# Patient Record
Sex: FEMALE | Race: WHITE | NOT HISPANIC OR LATINO | Employment: FULL TIME | URBAN - METROPOLITAN AREA
[De-identification: names, ages, dates, MRNs, and addresses within clinical notes are randomized per-mention and may not be internally consistent; named-entity substitution may affect disease eponyms.]

---

## 2019-07-18 DIAGNOSIS — M79.606 PAIN OF LOWER EXTREMITY, UNSPECIFIED LATERALITY: Primary | ICD-10-CM

## 2019-07-19 ENCOUNTER — OFFICE VISIT (OUTPATIENT)
Dept: ORTHOPEDICS | Facility: CLINIC | Age: 64
End: 2019-07-19
Payer: MEDICARE

## 2019-07-19 VITALS
BODY MASS INDEX: 28.82 KG/M2 | WEIGHT: 162.69 LBS | DIASTOLIC BLOOD PRESSURE: 80 MMHG | SYSTOLIC BLOOD PRESSURE: 138 MMHG | HEIGHT: 63 IN

## 2019-07-19 DIAGNOSIS — S80.11XA CONTUSION OF RIGHT LEG, INITIAL ENCOUNTER: ICD-10-CM

## 2019-07-19 PROBLEM — S80.02XA CONTUSION OF LEFT KNEE AND LOWER LEG: Status: ACTIVE | Noted: 2019-07-19

## 2019-07-19 PROBLEM — S80.12XA CONTUSION OF LEFT KNEE AND LOWER LEG: Status: ACTIVE | Noted: 2019-07-19

## 2019-07-19 PROCEDURE — 99999 PR PBB SHADOW E&M-EST. PATIENT-LVL III: ICD-10-PCS | Mod: PBBFAC,,, | Performed by: ORTHOPAEDIC SURGERY

## 2019-07-19 PROCEDURE — 1101F PR PT FALLS ASSESS DOC 0-1 FALLS W/OUT INJ PAST YR: ICD-10-PCS | Mod: CPTII,S$GLB,, | Performed by: ORTHOPAEDIC SURGERY

## 2019-07-19 PROCEDURE — 3008F BODY MASS INDEX DOCD: CPT | Mod: CPTII,S$GLB,, | Performed by: ORTHOPAEDIC SURGERY

## 2019-07-19 PROCEDURE — 99203 PR OFFICE/OUTPT VISIT, NEW, LEVL III, 30-44 MIN: ICD-10-PCS | Mod: S$GLB,,, | Performed by: ORTHOPAEDIC SURGERY

## 2019-07-19 PROCEDURE — 3008F PR BODY MASS INDEX (BMI) DOCUMENTED: ICD-10-PCS | Mod: CPTII,S$GLB,, | Performed by: ORTHOPAEDIC SURGERY

## 2019-07-19 PROCEDURE — 3075F PR MOST RECENT SYSTOLIC BLOOD PRESS GE 130-139MM HG: ICD-10-PCS | Mod: CPTII,S$GLB,, | Performed by: ORTHOPAEDIC SURGERY

## 2019-07-19 PROCEDURE — 99203 OFFICE O/P NEW LOW 30 MIN: CPT | Mod: S$GLB,,, | Performed by: ORTHOPAEDIC SURGERY

## 2019-07-19 PROCEDURE — 3079F PR MOST RECENT DIASTOLIC BLOOD PRESSURE 80-89 MM HG: ICD-10-PCS | Mod: CPTII,S$GLB,, | Performed by: ORTHOPAEDIC SURGERY

## 2019-07-19 PROCEDURE — 3075F SYST BP GE 130 - 139MM HG: CPT | Mod: CPTII,S$GLB,, | Performed by: ORTHOPAEDIC SURGERY

## 2019-07-19 PROCEDURE — 3079F DIAST BP 80-89 MM HG: CPT | Mod: CPTII,S$GLB,, | Performed by: ORTHOPAEDIC SURGERY

## 2019-07-19 PROCEDURE — 99999 PR PBB SHADOW E&M-EST. PATIENT-LVL III: CPT | Mod: PBBFAC,,, | Performed by: ORTHOPAEDIC SURGERY

## 2019-07-19 PROCEDURE — 1101F PT FALLS ASSESS-DOCD LE1/YR: CPT | Mod: CPTII,S$GLB,, | Performed by: ORTHOPAEDIC SURGERY

## 2019-07-19 RX ORDER — CLOPIDOGREL BISULFATE 75 MG/1
TABLET ORAL
Refills: 1 | COMMUNITY
Start: 2019-05-21

## 2019-07-19 RX ORDER — ROSUVASTATIN CALCIUM 20 MG/1
TABLET, COATED ORAL
Refills: 3 | COMMUNITY
Start: 2019-06-13

## 2019-07-19 RX ORDER — LISINOPRIL 5 MG/1
TABLET ORAL
Refills: 1 | COMMUNITY
Start: 2019-06-14

## 2019-07-19 RX ORDER — ISOSORBIDE MONONITRATE 30 MG/1
TABLET, EXTENDED RELEASE ORAL
Refills: 1 | COMMUNITY
Start: 2019-06-15

## 2019-07-19 RX ORDER — RANOLAZINE 500 MG/1
TABLET, EXTENDED RELEASE ORAL
Refills: 1 | COMMUNITY
Start: 2019-05-22

## 2019-07-19 RX ORDER — ZOLPIDEM TARTRATE 10 MG/1
10 TABLET ORAL
COMMUNITY
Start: 2019-04-25

## 2019-07-19 RX ORDER — DOXYCYCLINE 100 MG/1
CAPSULE ORAL
Refills: 0 | COMMUNITY
Start: 2019-07-15

## 2019-07-19 RX ORDER — ACETAMINOPHEN AND PHENYLEPHRINE HCL 325; 5 MG/1; MG/1
TABLET ORAL
COMMUNITY

## 2019-07-19 RX ORDER — CHOLECALCIFEROL (VITAMIN D3) 50 MCG
TABLET ORAL DAILY
COMMUNITY

## 2019-07-19 NOTE — PROGRESS NOTES
Chief Complaint   Patient presents with    Right Lower Leg - Injury, Pain, Swelling       This patient was seen in consultation at the request of Soledad Bunn     HPI: Kirti Caraballo is a 64 y.o. female who presents today complaining of Injury, Pain, and Swelling of the Right Lower Leg       Two weeks ago was stepping onto a chair which slipped out from her and she fell. She hit the long on the chair. She ws able to ambulate and did not immediately seek medical care.  Since then her pain has worsened. It is not bad when she is resting but is worse when she first gets up from sitting and when she has been walking for long periods of time. Not worse with weight bearing. She denies numbness and paresthesias  She had xrays taken with DIS and was told she had a fracture of the proximal fibula and was referred to me for further evaluation    This is the extent of the patient's complaints at this time.     Review of Systems   All other systems reviewed and are negative.        Review of patient's allergies indicates:   Allergen Reactions    Morpholine analogues Itching    Penicillins Hives    Sulfa (sulfonamide antibiotics) Rash         Current Outpatient Medications:     aspirin 81 MG Chew, Take 81 mg by mouth once daily., Disp: , Rfl:     biotin 10,000 mcg Cap, Take by mouth., Disp: , Rfl:     cholecalciferol, vitamin D3, (VITAMIN D3) 2,000 unit Tab, Take by mouth once daily., Disp: , Rfl:     citalopram (CELEXA) 20 MG tablet, Take 1 tablet (20 mg total) by mouth once daily., Disp: 30 tablet, Rfl: 3    clopidogrel (PLAVIX) 75 mg tablet, TK 1 T PO QD, Disp: , Rfl: 1    doxycycline (VIBRAMYCIN) 100 MG Cap, TK 1 C PO BID FOR 7 DAYS, Disp: , Rfl: 0    enalapril (VASOTEC) 2.5 MG tablet, Take 1 tablet (2.5 mg total) by mouth once daily., Disp: 30 tablet, Rfl: 3    hydrochlorothiazide (HYDRODIURIL) 12.5 MG Tab, Take 1 tablet (12.5 mg total) by mouth once daily., Disp: 30 tablet, Rfl: 3     hydrocodone-acetaminophen 7.5-500 mg (LORTAB) 7.5-500 mg per tablet, Take by mouth. 1 - 2 Tablet Oral Every 6 hours, Disp: , Rfl:     isosorbide mononitrate (IMDUR) 30 MG 24 hr tablet, TK 1 T PO  BID, Disp: , Rfl: 1    lisinopril (PRINIVIL,ZESTRIL) 5 MG tablet, TK 1 T PO QD, Disp: , Rfl: 1    metformin (GLUCOPHAGE) 500 MG tablet, Take 1 tablet (500 mg total) by mouth 2 (two) times daily with meals., Disp: 60 tablet, Rfl: 3    metoprolol succinate (TOPROL-XL) 25 MG 24 hr tablet, Take 1 tablet (25 mg total) by mouth once daily., Disp: 30 tablet, Rfl: 3    ranitidine (ZANTAC) 150 MG tablet, TK 2 TS PO NIGHTLY, Disp: , Rfl: 2    ranolazine (RANEXA) 500 MG Tb12, TK 1 T PO BID, Disp: , Rfl: 1    rosuvastatin (CRESTOR) 20 MG tablet, TK 1 T PO  QD, Disp: , Rfl: 3    simvastatin (ZOCOR) 80 MG tablet, Take 1 tablet (80 mg total) by mouth every evening., Disp: 30 tablet, Rfl: 3    zolpidem (AMBIEN) 10 mg Tab, Take 10 mg by mouth., Disp: , Rfl:     nitroGLYCERIN (NITROSTAT) 0.4 MG SL tablet, Place 1 tablet (0.4 mg total) under the tongue every 5 (five) minutes as needed for Chest pain., Disp: 25 tablet, Rfl: 6    Past Medical History:   Diagnosis Date    Coronary artery disease     Hyperlipidemia     Hypertension        Patient Active Problem List   Diagnosis    HTN (hypertension)    Hyperlipidemia    Depression    Coronary artery disease    DM (diabetes mellitus)       Past Surgical History:   Procedure Laterality Date    BREAST SURGERY      benign cyst    CARPAL TUNNEL RELEASE       SECTION      HYSTERECTOMY      ROTATOR CUFF REPAIR      TONSILLECTOMY         Social History     Tobacco Use    Smoking status: Never Smoker   Substance Use Topics    Alcohol use: Yes     Comment: Rarely    Drug use: Not on file       Family History   Adopted: Yes   Problem Relation Age of Onset    Cancer Mother         biological mother with breast cancer       Physical Exam:     Vitals:    19 0950   BP:  138/80           General: Weight: 73.8 kg (162 lb 11.2 oz) Body mass index is 28.82 kg/m².  Patient is alert, awake and oriented to time, place and person. Mood and affect are appropriate.  Patient does not appear to be in any distress, denies any constitutional symptoms and appears stated age.   HEENT: Pupils are equal and round, sclera are not injected. External examination of ears and nose reveals no abnormalities. Cranial nerves II-X are grossly intact  Skin: no rashes, abrasions or open wounds on the affected extremity   Resp: No respiratory distress or audible wheezing   CV: 2+  pulses, all extremities warm and well perfused     Right Lower Extremity    Resolving ecchymosis over anterior and medial leg with associated tenderness  No abrasions or lacerations  Nontender over the entire length of the fibula  No ankle tenderness or swelling  Ltsi s/s/sp/dp/t  + ehl/fhl/ta/gs  2+ DP      Imaging:  Two views of the right tib-fib were reviewed -- there are degenerative changes of the proximal end of the fibula and tibia fibular joint however do not see any fracture in this area. No tibia fracture     Assessment: 64 y.o. female with right leg contusion    I explained my diagnostic impression and the reasoning behind it in detail, using layman's terms.  Models and/or pictures were used to help in the explanation.    Plan:   - rice therapy  -activity as tolerated  - Return to clinic in 2 weeks if symptoms worsen or fail to improve --will consider MRI if no improvement    All questions were answered in detail. The patient is in full agreement with the treatment plan and will proceed accordingly.    A note notifying Soledad Bunn of my findings was sent via the electronic medical record     This note was created by combination of typed  and M-Modal dictation. Transcription and phonetic errors may be present.  If there are any questions, please contact me.

## 2019-07-19 NOTE — LETTER
July 19, 2019      Soledad Bunn, CHACHA  [address] [facility]  [address]  [place] [address]           [facility]  [address]  [place] [address]  Phone: [phone]          Patient: Kirti Caraballo   MR Number: [id number]   YOB: 1955   Date of Visit: 7/19/2019       Dear Soledad Bunn:    Thank you for referring Kirti Caraballo to me for evaluation. Attached you will find relevant portions of my assessment and plan of care.    If you have questions, please do not hesitate to call me. I look forward to following Kirti Caraballo along with you.    Sincerely,    Sully Noguera MD    Enclosure  CC:  No Recipients    If you would like to receive this communication electronically, please contact [email or web address] or [phone] to request more information on [employer] access.    For providers and/or their staff who would like to refer a patient to [facility], please contact us through our one-stop-shop provider referral line, [facility], at [phone].    If you feel you have received this communication in error or would no longer like to receive these types of communications, please e-mail [email or web address]

## 2021-02-25 ENCOUNTER — IMMUNIZATION (OUTPATIENT)
Dept: OBSTETRICS AND GYNECOLOGY | Facility: CLINIC | Age: 66
End: 2021-02-25
Payer: MEDICARE

## 2021-02-25 DIAGNOSIS — Z23 NEED FOR VACCINATION: Primary | ICD-10-CM

## 2021-02-25 PROCEDURE — 91300 COVID-19, MRNA, LNP-S, PF, 30 MCG/0.3 ML DOSE VACCINE: CPT | Mod: PBBFAC | Performed by: FAMILY MEDICINE

## 2021-03-18 ENCOUNTER — IMMUNIZATION (OUTPATIENT)
Dept: OBSTETRICS AND GYNECOLOGY | Facility: CLINIC | Age: 66
End: 2021-03-18
Payer: MEDICARE

## 2021-03-18 DIAGNOSIS — Z23 NEED FOR VACCINATION: Primary | ICD-10-CM

## 2021-03-18 PROCEDURE — 0002A COVID-19, MRNA, LNP-S, PF, 30 MCG/0.3 ML DOSE VACCINE: CPT | Mod: CV19,S$GLB,, | Performed by: FAMILY MEDICINE

## 2021-03-18 PROCEDURE — 91300 COVID-19, MRNA, LNP-S, PF, 30 MCG/0.3 ML DOSE VACCINE: CPT | Mod: S$GLB,,, | Performed by: FAMILY MEDICINE

## 2021-03-18 PROCEDURE — 0002A COVID-19, MRNA, LNP-S, PF, 30 MCG/0.3 ML DOSE VACCINE: ICD-10-PCS | Mod: CV19,S$GLB,, | Performed by: FAMILY MEDICINE

## 2021-03-18 PROCEDURE — 91300 COVID-19, MRNA, LNP-S, PF, 30 MCG/0.3 ML DOSE VACCINE: ICD-10-PCS | Mod: S$GLB,,, | Performed by: FAMILY MEDICINE

## 2021-06-21 ENCOUNTER — APPOINTMENT (OUTPATIENT)
Dept: RADIOLOGY | Facility: HOSPITAL | Age: 66
End: 2021-06-21
Attending: ORTHOPAEDIC SURGERY
Payer: MEDICARE

## 2021-06-21 ENCOUNTER — OFFICE VISIT (OUTPATIENT)
Dept: ORTHOPEDICS | Facility: CLINIC | Age: 66
End: 2021-06-21
Payer: MEDICARE

## 2021-06-21 VITALS
RESPIRATION RATE: 18 BRPM | HEIGHT: 63 IN | TEMPERATURE: 99 F | OXYGEN SATURATION: 97 % | BODY MASS INDEX: 28.87 KG/M2 | WEIGHT: 162.94 LBS | HEART RATE: 88 BPM

## 2021-06-21 DIAGNOSIS — M79.671 PAIN OF RIGHT HEEL: ICD-10-CM

## 2021-06-21 DIAGNOSIS — M79.671 PAIN OF RIGHT HEEL: Primary | ICD-10-CM

## 2021-06-21 DIAGNOSIS — M76.61 ACHILLES TENDINITIS OF RIGHT LOWER EXTREMITY: Primary | ICD-10-CM

## 2021-06-21 PROCEDURE — 1125F PR PAIN SEVERITY QUANTIFIED, PAIN PRESENT: ICD-10-PCS | Mod: S$GLB,,, | Performed by: ORTHOPAEDIC SURGERY

## 2021-06-21 PROCEDURE — 3008F BODY MASS INDEX DOCD: CPT | Mod: CPTII,S$GLB,, | Performed by: ORTHOPAEDIC SURGERY

## 2021-06-21 PROCEDURE — 99999 PR PBB SHADOW E&M-EST. PATIENT-LVL V: CPT | Mod: PBBFAC,,, | Performed by: ORTHOPAEDIC SURGERY

## 2021-06-21 PROCEDURE — 1125F AMNT PAIN NOTED PAIN PRSNT: CPT | Mod: S$GLB,,, | Performed by: ORTHOPAEDIC SURGERY

## 2021-06-21 PROCEDURE — 73650 X-RAY EXAM OF HEEL: CPT | Mod: TC,FY,PN,RT

## 2021-06-21 PROCEDURE — 3288F PR FALLS RISK ASSESSMENT DOCUMENTED: ICD-10-PCS | Mod: CPTII,S$GLB,, | Performed by: ORTHOPAEDIC SURGERY

## 2021-06-21 PROCEDURE — 3008F PR BODY MASS INDEX (BMI) DOCUMENTED: ICD-10-PCS | Mod: CPTII,S$GLB,, | Performed by: ORTHOPAEDIC SURGERY

## 2021-06-21 PROCEDURE — 99213 PR OFFICE/OUTPT VISIT, EST, LEVL III, 20-29 MIN: ICD-10-PCS | Mod: S$GLB,,, | Performed by: ORTHOPAEDIC SURGERY

## 2021-06-21 PROCEDURE — 73650 XR CALCANEUS 2 VIEW RIGHT: ICD-10-PCS | Mod: 26,RT,, | Performed by: RADIOLOGY

## 2021-06-21 PROCEDURE — 99213 OFFICE O/P EST LOW 20 MIN: CPT | Mod: S$GLB,,, | Performed by: ORTHOPAEDIC SURGERY

## 2021-06-21 PROCEDURE — 1159F MED LIST DOCD IN RCRD: CPT | Mod: S$GLB,,, | Performed by: ORTHOPAEDIC SURGERY

## 2021-06-21 PROCEDURE — 1159F PR MEDICATION LIST DOCUMENTED IN MEDICAL RECORD: ICD-10-PCS | Mod: S$GLB,,, | Performed by: ORTHOPAEDIC SURGERY

## 2021-06-21 PROCEDURE — 99999 PR PBB SHADOW E&M-EST. PATIENT-LVL V: ICD-10-PCS | Mod: PBBFAC,,, | Performed by: ORTHOPAEDIC SURGERY

## 2021-06-21 PROCEDURE — 3288F FALL RISK ASSESSMENT DOCD: CPT | Mod: CPTII,S$GLB,, | Performed by: ORTHOPAEDIC SURGERY

## 2021-06-21 PROCEDURE — 73650 X-RAY EXAM OF HEEL: CPT | Mod: 26,RT,, | Performed by: RADIOLOGY

## 2021-06-21 PROCEDURE — 1100F PTFALLS ASSESS-DOCD GE2>/YR: CPT | Mod: CPTII,S$GLB,, | Performed by: ORTHOPAEDIC SURGERY

## 2021-06-21 PROCEDURE — 1100F PR PT FALLS ASSESS DOC 2+ FALLS/FALL W/INJURY/YR: ICD-10-PCS | Mod: CPTII,S$GLB,, | Performed by: ORTHOPAEDIC SURGERY

## 2021-07-25 ENCOUNTER — TELEPHONE (OUTPATIENT)
Dept: URGENT CARE | Facility: CLINIC | Age: 66
End: 2021-07-25

## 2021-07-25 ENCOUNTER — CLINICAL SUPPORT (OUTPATIENT)
Dept: URGENT CARE | Facility: CLINIC | Age: 66
End: 2021-07-25
Payer: MEDICARE

## 2021-07-25 DIAGNOSIS — Z20.822 EXPOSURE TO COVID-19 VIRUS: Primary | ICD-10-CM

## 2021-07-25 DIAGNOSIS — U07.1 COVID-19 VIRUS DETECTED: Primary | ICD-10-CM

## 2021-07-25 LAB
CTP QC/QA: YES
SARS-COV-2 RDRP RESP QL NAA+PROBE: POSITIVE

## 2021-07-25 PROCEDURE — U0002: ICD-10-PCS | Mod: QW,S$GLB,, | Performed by: NURSE PRACTITIONER

## 2021-07-25 PROCEDURE — U0002 COVID-19 LAB TEST NON-CDC: HCPCS | Mod: QW,S$GLB,, | Performed by: NURSE PRACTITIONER

## 2021-07-26 ENCOUNTER — INFUSION (OUTPATIENT)
Dept: INFECTIOUS DISEASES | Facility: HOSPITAL | Age: 66
End: 2021-07-26
Attending: NURSE PRACTITIONER
Payer: MEDICARE

## 2021-07-26 VITALS
SYSTOLIC BLOOD PRESSURE: 114 MMHG | TEMPERATURE: 98 F | WEIGHT: 160 LBS | OXYGEN SATURATION: 98 % | RESPIRATION RATE: 16 BRPM | HEIGHT: 62 IN | HEART RATE: 70 BPM | DIASTOLIC BLOOD PRESSURE: 79 MMHG | BODY MASS INDEX: 29.44 KG/M2

## 2021-07-26 DIAGNOSIS — U07.1 COVID-19: Primary | ICD-10-CM

## 2021-07-26 PROCEDURE — M0243 CASIRIVI AND IMDEVI INFUSION: HCPCS | Performed by: INTERNAL MEDICINE

## 2021-07-26 PROCEDURE — 25000003 PHARM REV CODE 250: Performed by: INTERNAL MEDICINE

## 2021-07-26 PROCEDURE — 63600175 PHARM REV CODE 636 W HCPCS: Performed by: INTERNAL MEDICINE

## 2021-07-26 RX ORDER — DIPHENHYDRAMINE HYDROCHLORIDE 50 MG/ML
25 INJECTION INTRAMUSCULAR; INTRAVENOUS ONCE AS NEEDED
Status: ACTIVE | OUTPATIENT
Start: 2021-07-26 | End: 2032-12-22

## 2021-07-26 RX ORDER — ONDANSETRON 4 MG/1
4 TABLET, ORALLY DISINTEGRATING ORAL ONCE AS NEEDED
Status: ACTIVE | OUTPATIENT
Start: 2021-07-26 | End: 2032-12-22

## 2021-07-26 RX ORDER — ALBUTEROL SULFATE 90 UG/1
2 AEROSOL, METERED RESPIRATORY (INHALATION)
Status: ACTIVE | OUTPATIENT
Start: 2021-07-26

## 2021-07-26 RX ORDER — ACETAMINOPHEN 325 MG/1
650 TABLET ORAL ONCE AS NEEDED
Status: ACTIVE | OUTPATIENT
Start: 2021-07-26 | End: 2032-12-22

## 2021-07-26 RX ORDER — EPINEPHRINE 0.3 MG/.3ML
0.3 INJECTION SUBCUTANEOUS
Status: ACTIVE | OUTPATIENT
Start: 2021-07-26

## 2021-07-26 RX ORDER — SODIUM CHLORIDE 0.9 % (FLUSH) 0.9 %
10 SYRINGE (ML) INJECTION
Status: ACTIVE | OUTPATIENT
Start: 2021-07-26

## 2021-07-26 RX ADMIN — CASIRIVIMAB AND IMDEVIMAB 600 MG: 600; 600 INJECTION, SOLUTION, CONCENTRATE INTRAVENOUS at 12:07

## 2022-06-02 ENCOUNTER — OFFICE VISIT (OUTPATIENT)
Dept: ORTHOPEDICS | Facility: CLINIC | Age: 67
End: 2022-06-02
Payer: MEDICARE

## 2022-06-02 VITALS
RESPIRATION RATE: 18 BRPM | SYSTOLIC BLOOD PRESSURE: 148 MMHG | BODY MASS INDEX: 31.8 KG/M2 | DIASTOLIC BLOOD PRESSURE: 82 MMHG | WEIGHT: 172.81 LBS | OXYGEN SATURATION: 97 % | HEIGHT: 62 IN | HEART RATE: 83 BPM

## 2022-06-02 DIAGNOSIS — M65.4 DE QUERVAIN'S TENOSYNOVITIS, RIGHT: Primary | ICD-10-CM

## 2022-06-02 DIAGNOSIS — M25.531 RIGHT WRIST PAIN: Primary | ICD-10-CM

## 2022-06-02 PROCEDURE — 99999 PR PBB SHADOW E&M-EST. PATIENT-LVL V: ICD-10-PCS | Mod: PBBFAC,,, | Performed by: ORTHOPAEDIC SURGERY

## 2022-06-02 PROCEDURE — 99213 OFFICE O/P EST LOW 20 MIN: CPT | Mod: S$GLB,,, | Performed by: ORTHOPAEDIC SURGERY

## 2022-06-02 PROCEDURE — 1159F MED LIST DOCD IN RCRD: CPT | Mod: CPTII,S$GLB,, | Performed by: ORTHOPAEDIC SURGERY

## 2022-06-02 PROCEDURE — 1125F AMNT PAIN NOTED PAIN PRSNT: CPT | Mod: CPTII,S$GLB,, | Performed by: ORTHOPAEDIC SURGERY

## 2022-06-02 PROCEDURE — 3077F SYST BP >= 140 MM HG: CPT | Mod: CPTII,S$GLB,, | Performed by: ORTHOPAEDIC SURGERY

## 2022-06-02 PROCEDURE — 4010F PR ACE/ARB THEARPY RXD/TAKEN: ICD-10-PCS | Mod: CPTII,S$GLB,, | Performed by: ORTHOPAEDIC SURGERY

## 2022-06-02 PROCEDURE — 1160F PR REVIEW ALL MEDS BY PRESCRIBER/CLIN PHARMACIST DOCUMENTED: ICD-10-PCS | Mod: CPTII,S$GLB,, | Performed by: ORTHOPAEDIC SURGERY

## 2022-06-02 PROCEDURE — 3079F PR MOST RECENT DIASTOLIC BLOOD PRESSURE 80-89 MM HG: ICD-10-PCS | Mod: CPTII,S$GLB,, | Performed by: ORTHOPAEDIC SURGERY

## 2022-06-02 PROCEDURE — 99999 PR PBB SHADOW E&M-EST. PATIENT-LVL V: CPT | Mod: PBBFAC,,, | Performed by: ORTHOPAEDIC SURGERY

## 2022-06-02 PROCEDURE — 1125F PR PAIN SEVERITY QUANTIFIED, PAIN PRESENT: ICD-10-PCS | Mod: CPTII,S$GLB,, | Performed by: ORTHOPAEDIC SURGERY

## 2022-06-02 PROCEDURE — 3079F DIAST BP 80-89 MM HG: CPT | Mod: CPTII,S$GLB,, | Performed by: ORTHOPAEDIC SURGERY

## 2022-06-02 PROCEDURE — 3288F PR FALLS RISK ASSESSMENT DOCUMENTED: ICD-10-PCS | Mod: CPTII,S$GLB,, | Performed by: ORTHOPAEDIC SURGERY

## 2022-06-02 PROCEDURE — 3077F PR MOST RECENT SYSTOLIC BLOOD PRESSURE >= 140 MM HG: ICD-10-PCS | Mod: CPTII,S$GLB,, | Performed by: ORTHOPAEDIC SURGERY

## 2022-06-02 PROCEDURE — 1159F PR MEDICATION LIST DOCUMENTED IN MEDICAL RECORD: ICD-10-PCS | Mod: CPTII,S$GLB,, | Performed by: ORTHOPAEDIC SURGERY

## 2022-06-02 PROCEDURE — 3008F PR BODY MASS INDEX (BMI) DOCUMENTED: ICD-10-PCS | Mod: CPTII,S$GLB,, | Performed by: ORTHOPAEDIC SURGERY

## 2022-06-02 PROCEDURE — 99213 PR OFFICE/OUTPT VISIT, EST, LEVL III, 20-29 MIN: ICD-10-PCS | Mod: S$GLB,,, | Performed by: ORTHOPAEDIC SURGERY

## 2022-06-02 PROCEDURE — 3288F FALL RISK ASSESSMENT DOCD: CPT | Mod: CPTII,S$GLB,, | Performed by: ORTHOPAEDIC SURGERY

## 2022-06-02 PROCEDURE — 1101F PT FALLS ASSESS-DOCD LE1/YR: CPT | Mod: CPTII,S$GLB,, | Performed by: ORTHOPAEDIC SURGERY

## 2022-06-02 PROCEDURE — 1101F PR PT FALLS ASSESS DOC 0-1 FALLS W/OUT INJ PAST YR: ICD-10-PCS | Mod: CPTII,S$GLB,, | Performed by: ORTHOPAEDIC SURGERY

## 2022-06-02 PROCEDURE — 4010F ACE/ARB THERAPY RXD/TAKEN: CPT | Mod: CPTII,S$GLB,, | Performed by: ORTHOPAEDIC SURGERY

## 2022-06-02 PROCEDURE — 1160F RVW MEDS BY RX/DR IN RCRD: CPT | Mod: CPTII,S$GLB,, | Performed by: ORTHOPAEDIC SURGERY

## 2022-06-02 PROCEDURE — 3008F BODY MASS INDEX DOCD: CPT | Mod: CPTII,S$GLB,, | Performed by: ORTHOPAEDIC SURGERY

## 2022-06-02 NOTE — PROGRESS NOTES
Assessment: 67 y.o. female with R dequervains tenosynovitis     I explained my diagnostic impression and the reasoning behind it in detail, using layman's terms.     Plan:   - Thumb spica brace  - Voltaren gel   - Can consider injection if no improvement with these treatment modalities   - Return to clinic PRN    All questions were answered in detail. The patient is in full agreement with the treatment plan and will proceed accordingly.    Chief Complaint   Patient presents with    Right Wrist - Pain       Initial visit (6/2/22): Kirti Caraballo is a 67 y.o. female who presents today complaining of Pain of the Right Wrist     Duration of symptoms:  A few weeks   Trauma or new activity: no  Pain is intermittent  Aggravating factors: motion of the wrist   Relieving factors: rest   Radicular symptoms: no numbness, paresthesias   Associated symptoms:  swelling over first dorsal compartment   Prior treatment:  None   Pain does interfere with activities of daily living .  Enjoys crafting     This is the extent of the patient's complaints at this time.       Review of patient's allergies indicates:   Allergen Reactions    Morpholine analogues Itching    Penicillins Hives    Sulfa (sulfonamide antibiotics) Rash         Current Outpatient Medications:     aspirin 81 MG Chew, Take 81 mg by mouth once daily., Disp: , Rfl:     biotin 10,000 mcg Cap, Take by mouth., Disp: , Rfl:     cholecalciferol, vitamin D3, (VITAMIN D3) 50 mcg (2,000 unit) Tab, Take by mouth once daily., Disp: , Rfl:     citalopram (CELEXA) 20 MG tablet, Take 1 tablet (20 mg total) by mouth once daily., Disp: 30 tablet, Rfl: 3    clopidogrel (PLAVIX) 75 mg tablet, TK 1 T PO QD, Disp: , Rfl: 1    doxycycline (VIBRAMYCIN) 100 MG Cap, TK 1 C PO BID FOR 7 DAYS, Disp: , Rfl: 0    enalapril (VASOTEC) 2.5 MG tablet, Take 1 tablet (2.5 mg total) by mouth once daily., Disp: 30 tablet, Rfl: 3    hydrochlorothiazide (HYDRODIURIL) 12.5 MG Tab, Take 1 tablet  (12.5 mg total) by mouth once daily., Disp: 30 tablet, Rfl: 3    hydrocodone-acetaminophen 7.5-500 mg (LORTAB) 7.5-500 mg per tablet, Take by mouth. 1 - 2 Tablet Oral Every 6 hours, Disp: , Rfl:     isosorbide mononitrate (IMDUR) 30 MG 24 hr tablet, TK 1 T PO  BID, Disp: , Rfl: 1    lisinopril (PRINIVIL,ZESTRIL) 5 MG tablet, TK 1 T PO QD, Disp: , Rfl: 1    metformin (GLUCOPHAGE) 500 MG tablet, Take 1 tablet (500 mg total) by mouth 2 (two) times daily with meals., Disp: 60 tablet, Rfl: 3    metoprolol succinate (TOPROL-XL) 25 MG 24 hr tablet, Take 1 tablet (25 mg total) by mouth once daily., Disp: 30 tablet, Rfl: 3    ranitidine (ZANTAC) 150 MG tablet, TK 2 TS PO NIGHTLY, Disp: , Rfl: 2    ranolazine (RANEXA) 500 MG Tb12, TK 1 T PO BID, Disp: , Rfl: 1    rosuvastatin (CRESTOR) 20 MG tablet, TK 1 T PO  QD, Disp: , Rfl: 3    simvastatin (ZOCOR) 80 MG tablet, Take 1 tablet (80 mg total) by mouth every evening., Disp: 30 tablet, Rfl: 3    zolpidem (AMBIEN) 10 mg Tab, Take 10 mg by mouth., Disp: , Rfl:     nitroGLYCERIN (NITROSTAT) 0.4 MG SL tablet, Place 1 tablet (0.4 mg total) under the tongue every 5 (five) minutes as needed for Chest pain., Disp: 25 tablet, Rfl: 6    Current Facility-Administered Medications:     acetaminophen tablet 650 mg, 650 mg, Oral, Once PRN, Messi Bella MD    albuterol inhaler 2 puff, 2 puff, Inhalation, Q20 Min PRN, Messi Bella MD    diphenhydrAMINE injection 25 mg, 25 mg, Intravenous, Once PRN, Messi Bella MD    EPINEPHrine (EPIPEN) 0.3 mg/0.3 mL pen injection 0.3 mg, 0.3 mg, Intramuscular, PRN, Messi Bella MD    methylPREDNISolone sodium succinate injection 40 mg, 40 mg, Intravenous, Once PRN, Messi Bella MD    ondansetron disintegrating tablet 4 mg, 4 mg, Oral, Once PRN, Messi Bella MD    sodium chloride 0.9% 500 mL flush bag, , Intravenous, PRN, Messi Bella MD    sodium chloride 0.9% flush 10 mL, 10 mL, Intravenous, PRN,  "Messi Bella MD    Physical Exam:   Vitals:    06/02/22 0953   BP: (!) 148/82   Pulse: 83   Resp: 18   SpO2: 97%   Weight: 78.4 kg (172 lb 13.5 oz)   Height: 5' 2" (1.575 m)   PainSc:   7   PainLoc: Wrist       General: Weight: 78.4 kg (172 lb 13.5 oz) Body mass index is 31.61 kg/m².  Patient is alert, awake and oriented to time, place and person. Mood and affect are appropriate.  Patient does not appear to be in any distress, denies any constitutional symptoms and appears stated age.   HEENT:  Pupils are equal and round, sclera are not injected. External examination of ears and nose reveals no abnormalities. Cranial nerves II-X are grossly intact  Skin:  no rashes, abrasions or open wounds on the affected extremity   Resp:  No respiratory distress or audible wheezing   CV: 2+  pulses, all extremities warm and well perfused   Right Hand/Wrist Examination:    Observation/Inspection:  Swelling  none    Deformity  none  Discoloration  none     Scars   none    Atrophy  none    HAND/WRIST EXAMINATION:  Finkelstein's Test   Pos  WHAT Test    Pos  CMC grind    Neg  Circumduction test   Neg    Neurovascular Exam:  Digits WWP, brisk CR < 3s throughout  NVI motor/LTS to M/R/U nerves, radial pulse 2+  Tinel's Test - Carpal Tunnel  Neg  Tinel's Test - Cubital Tunnel  Neg  Phalen's Test    Neg  Median Nerve Compression Test Neg    ROM hand/wrist/elbow full, painless       Imaging: 3 views of the right wrist are negative for fracture/degenerative changes    I personally reviewed and interpreted the patient's imaging obtained today in clinic       This note was created by combination of typed  and M-Modal dictation. Transcription and phonetic errors may be present.  If there are any questions, please contact me.    Past Medical History:   Diagnosis Date    Coronary artery disease     Hyperlipidemia     Hypertension        Active Problem List with Overview Notes    Diagnosis Date Noted    Contusion of left knee " and lower leg 2019    DM (diabetes mellitus) 2013    HTN (hypertension) 2013    Hyperlipidemia 2013    Depression 2013    Coronary artery disease        Past Surgical History:   Procedure Laterality Date    BREAST SURGERY      benign cyst    CARPAL TUNNEL RELEASE       SECTION      HYSTERECTOMY      ROTATOR CUFF REPAIR      TONSILLECTOMY         Family History   Adopted: Yes   Problem Relation Age of Onset    Cancer Mother         biological mother with breast cancer

## 2022-06-20 ENCOUNTER — OFFICE VISIT (OUTPATIENT)
Dept: ORTHOPEDICS | Facility: CLINIC | Age: 67
End: 2022-06-20
Payer: MEDICARE

## 2022-06-20 VITALS
SYSTOLIC BLOOD PRESSURE: 110 MMHG | RESPIRATION RATE: 18 BRPM | WEIGHT: 171.94 LBS | OXYGEN SATURATION: 97 % | DIASTOLIC BLOOD PRESSURE: 60 MMHG | HEIGHT: 62 IN | HEART RATE: 81 BPM | BODY MASS INDEX: 31.64 KG/M2

## 2022-06-20 DIAGNOSIS — M65.4 DE QUERVAIN'S TENOSYNOVITIS, RIGHT: Primary | ICD-10-CM

## 2022-06-20 PROCEDURE — 3008F BODY MASS INDEX DOCD: CPT | Mod: CPTII,S$GLB,, | Performed by: ORTHOPAEDIC SURGERY

## 2022-06-20 PROCEDURE — 3074F SYST BP LT 130 MM HG: CPT | Mod: CPTII,S$GLB,, | Performed by: ORTHOPAEDIC SURGERY

## 2022-06-20 PROCEDURE — 3288F PR FALLS RISK ASSESSMENT DOCUMENTED: ICD-10-PCS | Mod: CPTII,S$GLB,, | Performed by: ORTHOPAEDIC SURGERY

## 2022-06-20 PROCEDURE — 99999 PR PBB SHADOW E&M-EST. PATIENT-LVL V: CPT | Mod: PBBFAC,,, | Performed by: ORTHOPAEDIC SURGERY

## 2022-06-20 PROCEDURE — 1100F PTFALLS ASSESS-DOCD GE2>/YR: CPT | Mod: CPTII,S$GLB,, | Performed by: ORTHOPAEDIC SURGERY

## 2022-06-20 PROCEDURE — 20550 TENDON SHEATH: ICD-10-PCS | Mod: RT,S$GLB,, | Performed by: ORTHOPAEDIC SURGERY

## 2022-06-20 PROCEDURE — 4010F PR ACE/ARB THEARPY RXD/TAKEN: ICD-10-PCS | Mod: CPTII,S$GLB,, | Performed by: ORTHOPAEDIC SURGERY

## 2022-06-20 PROCEDURE — 20550 NJX 1 TENDON SHEATH/LIGAMENT: CPT | Mod: RT,S$GLB,, | Performed by: ORTHOPAEDIC SURGERY

## 2022-06-20 PROCEDURE — 1125F PR PAIN SEVERITY QUANTIFIED, PAIN PRESENT: ICD-10-PCS | Mod: CPTII,S$GLB,, | Performed by: ORTHOPAEDIC SURGERY

## 2022-06-20 PROCEDURE — 1125F AMNT PAIN NOTED PAIN PRSNT: CPT | Mod: CPTII,S$GLB,, | Performed by: ORTHOPAEDIC SURGERY

## 2022-06-20 PROCEDURE — 99213 PR OFFICE/OUTPT VISIT, EST, LEVL III, 20-29 MIN: ICD-10-PCS | Mod: 25,S$GLB,, | Performed by: ORTHOPAEDIC SURGERY

## 2022-06-20 PROCEDURE — 3008F PR BODY MASS INDEX (BMI) DOCUMENTED: ICD-10-PCS | Mod: CPTII,S$GLB,, | Performed by: ORTHOPAEDIC SURGERY

## 2022-06-20 PROCEDURE — 3078F PR MOST RECENT DIASTOLIC BLOOD PRESSURE < 80 MM HG: ICD-10-PCS | Mod: CPTII,S$GLB,, | Performed by: ORTHOPAEDIC SURGERY

## 2022-06-20 PROCEDURE — 1159F PR MEDICATION LIST DOCUMENTED IN MEDICAL RECORD: ICD-10-PCS | Mod: CPTII,S$GLB,, | Performed by: ORTHOPAEDIC SURGERY

## 2022-06-20 PROCEDURE — 99213 OFFICE O/P EST LOW 20 MIN: CPT | Mod: 25,S$GLB,, | Performed by: ORTHOPAEDIC SURGERY

## 2022-06-20 PROCEDURE — 1100F PR PT FALLS ASSESS DOC 2+ FALLS/FALL W/INJURY/YR: ICD-10-PCS | Mod: CPTII,S$GLB,, | Performed by: ORTHOPAEDIC SURGERY

## 2022-06-20 PROCEDURE — 1160F RVW MEDS BY RX/DR IN RCRD: CPT | Mod: CPTII,S$GLB,, | Performed by: ORTHOPAEDIC SURGERY

## 2022-06-20 PROCEDURE — 3074F PR MOST RECENT SYSTOLIC BLOOD PRESSURE < 130 MM HG: ICD-10-PCS | Mod: CPTII,S$GLB,, | Performed by: ORTHOPAEDIC SURGERY

## 2022-06-20 PROCEDURE — 4010F ACE/ARB THERAPY RXD/TAKEN: CPT | Mod: CPTII,S$GLB,, | Performed by: ORTHOPAEDIC SURGERY

## 2022-06-20 PROCEDURE — 3288F FALL RISK ASSESSMENT DOCD: CPT | Mod: CPTII,S$GLB,, | Performed by: ORTHOPAEDIC SURGERY

## 2022-06-20 PROCEDURE — 99999 PR PBB SHADOW E&M-EST. PATIENT-LVL V: ICD-10-PCS | Mod: PBBFAC,,, | Performed by: ORTHOPAEDIC SURGERY

## 2022-06-20 PROCEDURE — 1160F PR REVIEW ALL MEDS BY PRESCRIBER/CLIN PHARMACIST DOCUMENTED: ICD-10-PCS | Mod: CPTII,S$GLB,, | Performed by: ORTHOPAEDIC SURGERY

## 2022-06-20 PROCEDURE — 1159F MED LIST DOCD IN RCRD: CPT | Mod: CPTII,S$GLB,, | Performed by: ORTHOPAEDIC SURGERY

## 2022-06-20 PROCEDURE — 3078F DIAST BP <80 MM HG: CPT | Mod: CPTII,S$GLB,, | Performed by: ORTHOPAEDIC SURGERY

## 2022-06-20 RX ORDER — TRIAMCINOLONE ACETONIDE 40 MG/ML
40 INJECTION, SUSPENSION INTRA-ARTICULAR; INTRAMUSCULAR
Status: DISCONTINUED | OUTPATIENT
Start: 2022-06-20 | End: 2022-06-20 | Stop reason: HOSPADM

## 2022-06-20 RX ADMIN — TRIAMCINOLONE ACETONIDE 40 MG: 40 INJECTION, SUSPENSION INTRA-ARTICULAR; INTRAMUSCULAR at 01:06

## 2022-06-20 NOTE — PATIENT INSTRUCTIONS
Your wrist  was injected with two medications today: a numbing medicine (lidocaine) and a corticosteroid (kenalog).  The numbing medicine works immediately and works for a few hours. The steroid medication takes up to a week to work.   You may notice that your pain returns or even worsens after the numbing medicine wears off.    If pain worsens, treat with ice 30 minutes on and 10 minutes off the area, over the counter or prescription anti-inflammatories or tylenol and rest.    Call for any redness, fevers, chills or inability to move the joint. Call the office if pain does not improve in 2-3 days.     If you have diabetes, the steroid can temporarily increase your blood sugar. This can last up to 1-2 weeks.   Be mindful of your diet and monitor your blood sugar at home  If you have any concerns about your blood sugar levels please contact your primary care physician

## 2022-06-20 NOTE — PROGRESS NOTES
Assessment: 67 y.o. female with R dequervains tenosynovitis     I explained my diagnostic impression and the reasoning behind it in detail, using layman's terms.     Plan:   - Thumb spica brace  - Voltaren gel   - Can consider injection if no improvement with these treatment modalities   - Return to clinic PRN    All questions were answered in detail. The patient is in full agreement with the treatment plan and will proceed accordingly.    Chief Complaint   Patient presents with    Right Wrist - Pain, Swelling       Initial visit (6/2/22): Kirti Caraballo is a 67 y.o. female who presents today complaining of Pain and Swelling of the Right Wrist     Duration of symptoms:  A few weeks   Trauma or new activity: no  Pain is intermittent  Aggravating factors: motion of the wrist   Relieving factors: rest   Radicular symptoms: no numbness, paresthesias   Associated symptoms:  swelling over first dorsal compartment   Prior treatment:  None   Pain does interfere with activities of daily living .  Enjoys crafting     6/20/22  Here for follow up of R Dequervains   No improvement with splint, voltaren    This is the extent of the patient's complaints at this time.       Review of patient's allergies indicates:   Allergen Reactions    Morpholine analogues Itching    Penicillins Hives    Sulfa (sulfonamide antibiotics) Rash         Current Outpatient Medications:     aspirin 81 MG Chew, Take 81 mg by mouth once daily., Disp: , Rfl:     biotin 10,000 mcg Cap, Take by mouth., Disp: , Rfl:     cholecalciferol, vitamin D3, (VITAMIN D3) 50 mcg (2,000 unit) Tab, Take by mouth once daily., Disp: , Rfl:     citalopram (CELEXA) 20 MG tablet, Take 1 tablet (20 mg total) by mouth once daily., Disp: 30 tablet, Rfl: 3    clopidogrel (PLAVIX) 75 mg tablet, TK 1 T PO QD, Disp: , Rfl: 1    doxycycline (VIBRAMYCIN) 100 MG Cap, TK 1 C PO BID FOR 7 DAYS, Disp: , Rfl: 0    enalapril (VASOTEC) 2.5 MG tablet, Take 1 tablet (2.5 mg total) by  mouth once daily., Disp: 30 tablet, Rfl: 3    hydrochlorothiazide (HYDRODIURIL) 12.5 MG Tab, Take 1 tablet (12.5 mg total) by mouth once daily., Disp: 30 tablet, Rfl: 3    hydrocodone-acetaminophen 7.5-500 mg (LORTAB) 7.5-500 mg per tablet, Take by mouth. 1 - 2 Tablet Oral Every 6 hours, Disp: , Rfl:     isosorbide mononitrate (IMDUR) 30 MG 24 hr tablet, TK 1 T PO  BID, Disp: , Rfl: 1    lisinopril (PRINIVIL,ZESTRIL) 5 MG tablet, TK 1 T PO QD, Disp: , Rfl: 1    metformin (GLUCOPHAGE) 500 MG tablet, Take 1 tablet (500 mg total) by mouth 2 (two) times daily with meals., Disp: 60 tablet, Rfl: 3    metoprolol succinate (TOPROL-XL) 25 MG 24 hr tablet, Take 1 tablet (25 mg total) by mouth once daily., Disp: 30 tablet, Rfl: 3    ranitidine (ZANTAC) 150 MG tablet, TK 2 TS PO NIGHTLY, Disp: , Rfl: 2    ranolazine (RANEXA) 500 MG Tb12, TK 1 T PO BID, Disp: , Rfl: 1    rosuvastatin (CRESTOR) 20 MG tablet, TK 1 T PO  QD, Disp: , Rfl: 3    simvastatin (ZOCOR) 80 MG tablet, Take 1 tablet (80 mg total) by mouth every evening., Disp: 30 tablet, Rfl: 3    zolpidem (AMBIEN) 10 mg Tab, Take 10 mg by mouth., Disp: , Rfl:     nitroGLYCERIN (NITROSTAT) 0.4 MG SL tablet, Place 1 tablet (0.4 mg total) under the tongue every 5 (five) minutes as needed for Chest pain., Disp: 25 tablet, Rfl: 6    Current Facility-Administered Medications:     acetaminophen tablet 650 mg, 650 mg, Oral, Once PRN, Messi Bella MD    albuterol inhaler 2 puff, 2 puff, Inhalation, Q20 Min PRN, Messi Bella MD    diphenhydrAMINE injection 25 mg, 25 mg, Intravenous, Once PRN, Messi Bella MD    EPINEPHrine (EPIPEN) 0.3 mg/0.3 mL pen injection 0.3 mg, 0.3 mg, Intramuscular, PRN, Messi Bella MD    methylPREDNISolone sodium succinate injection 40 mg, 40 mg, Intravenous, Once PRN, Messi Bella MD    ondansetron disintegrating tablet 4 mg, 4 mg, Oral, Once PRN, Messi Bella MD    sodium chloride 0.9% 500 mL flush  "bag, , Intravenous, PRN, Messi Bella MD    sodium chloride 0.9% flush 10 mL, 10 mL, Intravenous, PRN, Messi Bella MD    Physical Exam:   Vitals:    06/20/22 1252   BP: 110/60   Pulse: 81   Resp: 18   SpO2: 97%   Weight: 78 kg (171 lb 15.3 oz)   Height: 5' 2" (1.575 m)   PainSc:   7   PainLoc: Wrist       General: Weight: 78 kg (171 lb 15.3 oz) Body mass index is 31.45 kg/m².  Patient is alert, awake and oriented to time, place and person. Mood and affect are appropriate.  Patient does not appear to be in any distress, denies any constitutional symptoms and appears stated age.   HEENT:  Pupils are equal and round, sclera are not injected. External examination of ears and nose reveals no abnormalities. Cranial nerves II-X are grossly intact  Skin:  no rashes, abrasions or open wounds on the affected extremity   Resp:  No respiratory distress or audible wheezing   CV: 2+  pulses, all extremities warm and well perfused   Right Hand/Wrist Examination:    Observation/Inspection:  Swelling  none    Deformity  none  Discoloration  none     Scars   none    Atrophy  none    HAND/WRIST EXAMINATION:  Finkelstein's Test   Pos  WHAT Test    Pos  CMC grind    Neg  Circumduction test   Neg    Neurovascular Exam:  Digits WWP, brisk CR < 3s throughout  NVI motor/LTS to M/R/U nerves, radial pulse 2+  Tinel's Test - Carpal Tunnel  Neg  Tinel's Test - Cubital Tunnel  Neg  Phalen's Test    Neg  Median Nerve Compression Test Neg    ROM hand/wrist/elbow full, painless       Imaging: 3 views of the right wrist are negative for fracture/degenerative changes    I personally reviewed and interpreted the patient's imaging obtained today in clinic       This note was created by combination of typed  and M-Modal dictation. Transcription and phonetic errors may be present.  If there are any questions, please contact me.    Past Medical History:   Diagnosis Date    Coronary artery disease     Hyperlipidemia     " Hypertension        Active Problem List with Overview Notes    Diagnosis Date Noted    Contusion of left knee and lower leg 2019    DM (diabetes mellitus) 2013    HTN (hypertension) 2013    Hyperlipidemia 2013    Depression 2013    Coronary artery disease        Past Surgical History:   Procedure Laterality Date    BREAST SURGERY      benign cyst    CARPAL TUNNEL RELEASE       SECTION      HYSTERECTOMY      ROTATOR CUFF REPAIR      TONSILLECTOMY         Family History   Adopted: Yes   Problem Relation Age of Onset    Cancer Mother         biological mother with breast cancer

## 2022-06-20 NOTE — PROCEDURES
Tendon Sheath    Date/Time: 6/20/2022 1:00 PM  Performed by: Sully Noguera MD  Authorized by: Sully Noguera MD     Consent Done?:  Yes (Verbal)  Indications:  Pain  Timeout: prior to procedure the correct patient, procedure, and site was verified    Prep: patient was prepped and draped in usual sterile fashion      Local anesthesia used?: Yes    Local anesthetic:  Topical anesthetic  Location:  Wrist  Site:  R first doral compartment  Ultrasonic guidance for needle placement?: No    Needle size:  25 G  Approach:  Radial  Medications:  40 mg triamcinolone acetonide 40 mg/mL  Patient tolerance:  Patient tolerated the procedure well with no immediate complications

## 2022-09-29 ENCOUNTER — LAB VISIT (OUTPATIENT)
Dept: LAB | Facility: HOSPITAL | Age: 67
End: 2022-09-29
Attending: STUDENT IN AN ORGANIZED HEALTH CARE EDUCATION/TRAINING PROGRAM
Payer: MEDICARE

## 2022-09-29 ENCOUNTER — OFFICE VISIT (OUTPATIENT)
Dept: GASTROENTEROLOGY | Facility: CLINIC | Age: 67
End: 2022-09-29
Payer: MEDICARE

## 2022-09-29 VITALS
HEART RATE: 76 BPM | BODY MASS INDEX: 31.65 KG/M2 | SYSTOLIC BLOOD PRESSURE: 110 MMHG | DIASTOLIC BLOOD PRESSURE: 70 MMHG | WEIGHT: 172 LBS | HEIGHT: 62 IN

## 2022-09-29 DIAGNOSIS — D50.9 IRON DEFICIENCY ANEMIA, UNSPECIFIED IRON DEFICIENCY ANEMIA TYPE: Primary | ICD-10-CM

## 2022-09-29 DIAGNOSIS — K21.9 GASTROESOPHAGEAL REFLUX DISEASE, UNSPECIFIED WHETHER ESOPHAGITIS PRESENT: ICD-10-CM

## 2022-09-29 DIAGNOSIS — D64.9 ANEMIA, UNSPECIFIED TYPE: Primary | ICD-10-CM

## 2022-09-29 DIAGNOSIS — D64.9 ANEMIA, UNSPECIFIED TYPE: ICD-10-CM

## 2022-09-29 LAB
FERRITIN SERPL-MCNC: 19 NG/ML (ref 20–300)
IRON SERPL-MCNC: 63 UG/DL (ref 30–160)
SATURATED IRON: 10 % (ref 20–50)
TOTAL IRON BINDING CAPACITY: 604 UG/DL (ref 250–450)
TRANSFERRIN SERPL-MCNC: 408 MG/DL (ref 200–375)

## 2022-09-29 PROCEDURE — 3008F PR BODY MASS INDEX (BMI) DOCUMENTED: ICD-10-PCS | Mod: CPTII,S$GLB,, | Performed by: STUDENT IN AN ORGANIZED HEALTH CARE EDUCATION/TRAINING PROGRAM

## 2022-09-29 PROCEDURE — 3074F PR MOST RECENT SYSTOLIC BLOOD PRESSURE < 130 MM HG: ICD-10-PCS | Mod: CPTII,S$GLB,, | Performed by: STUDENT IN AN ORGANIZED HEALTH CARE EDUCATION/TRAINING PROGRAM

## 2022-09-29 PROCEDURE — 1101F PR PT FALLS ASSESS DOC 0-1 FALLS W/OUT INJ PAST YR: ICD-10-PCS | Mod: CPTII,S$GLB,, | Performed by: STUDENT IN AN ORGANIZED HEALTH CARE EDUCATION/TRAINING PROGRAM

## 2022-09-29 PROCEDURE — 4010F PR ACE/ARB THEARPY RXD/TAKEN: ICD-10-PCS | Mod: CPTII,S$GLB,, | Performed by: STUDENT IN AN ORGANIZED HEALTH CARE EDUCATION/TRAINING PROGRAM

## 2022-09-29 PROCEDURE — 4010F ACE/ARB THERAPY RXD/TAKEN: CPT | Mod: CPTII,S$GLB,, | Performed by: STUDENT IN AN ORGANIZED HEALTH CARE EDUCATION/TRAINING PROGRAM

## 2022-09-29 PROCEDURE — 99204 OFFICE O/P NEW MOD 45 MIN: CPT | Mod: S$GLB,,, | Performed by: STUDENT IN AN ORGANIZED HEALTH CARE EDUCATION/TRAINING PROGRAM

## 2022-09-29 PROCEDURE — 3078F DIAST BP <80 MM HG: CPT | Mod: CPTII,S$GLB,, | Performed by: STUDENT IN AN ORGANIZED HEALTH CARE EDUCATION/TRAINING PROGRAM

## 2022-09-29 PROCEDURE — 3008F BODY MASS INDEX DOCD: CPT | Mod: CPTII,S$GLB,, | Performed by: STUDENT IN AN ORGANIZED HEALTH CARE EDUCATION/TRAINING PROGRAM

## 2022-09-29 PROCEDURE — 99204 PR OFFICE/OUTPT VISIT, NEW, LEVL IV, 45-59 MIN: ICD-10-PCS | Mod: S$GLB,,, | Performed by: STUDENT IN AN ORGANIZED HEALTH CARE EDUCATION/TRAINING PROGRAM

## 2022-09-29 PROCEDURE — 3078F PR MOST RECENT DIASTOLIC BLOOD PRESSURE < 80 MM HG: ICD-10-PCS | Mod: CPTII,S$GLB,, | Performed by: STUDENT IN AN ORGANIZED HEALTH CARE EDUCATION/TRAINING PROGRAM

## 2022-09-29 PROCEDURE — 1159F MED LIST DOCD IN RCRD: CPT | Mod: CPTII,S$GLB,, | Performed by: STUDENT IN AN ORGANIZED HEALTH CARE EDUCATION/TRAINING PROGRAM

## 2022-09-29 PROCEDURE — 82728 ASSAY OF FERRITIN: CPT | Performed by: STUDENT IN AN ORGANIZED HEALTH CARE EDUCATION/TRAINING PROGRAM

## 2022-09-29 PROCEDURE — 99999 PR PBB SHADOW E&M-EST. PATIENT-LVL V: ICD-10-PCS | Mod: PBBFAC,,, | Performed by: STUDENT IN AN ORGANIZED HEALTH CARE EDUCATION/TRAINING PROGRAM

## 2022-09-29 PROCEDURE — 36415 COLL VENOUS BLD VENIPUNCTURE: CPT | Performed by: STUDENT IN AN ORGANIZED HEALTH CARE EDUCATION/TRAINING PROGRAM

## 2022-09-29 PROCEDURE — 1101F PT FALLS ASSESS-DOCD LE1/YR: CPT | Mod: CPTII,S$GLB,, | Performed by: STUDENT IN AN ORGANIZED HEALTH CARE EDUCATION/TRAINING PROGRAM

## 2022-09-29 PROCEDURE — 1159F PR MEDICATION LIST DOCUMENTED IN MEDICAL RECORD: ICD-10-PCS | Mod: CPTII,S$GLB,, | Performed by: STUDENT IN AN ORGANIZED HEALTH CARE EDUCATION/TRAINING PROGRAM

## 2022-09-29 PROCEDURE — 3074F SYST BP LT 130 MM HG: CPT | Mod: CPTII,S$GLB,, | Performed by: STUDENT IN AN ORGANIZED HEALTH CARE EDUCATION/TRAINING PROGRAM

## 2022-09-29 PROCEDURE — 3288F FALL RISK ASSESSMENT DOCD: CPT | Mod: CPTII,S$GLB,, | Performed by: STUDENT IN AN ORGANIZED HEALTH CARE EDUCATION/TRAINING PROGRAM

## 2022-09-29 PROCEDURE — 84466 ASSAY OF TRANSFERRIN: CPT | Performed by: STUDENT IN AN ORGANIZED HEALTH CARE EDUCATION/TRAINING PROGRAM

## 2022-09-29 PROCEDURE — 3288F PR FALLS RISK ASSESSMENT DOCUMENTED: ICD-10-PCS | Mod: CPTII,S$GLB,, | Performed by: STUDENT IN AN ORGANIZED HEALTH CARE EDUCATION/TRAINING PROGRAM

## 2022-09-29 PROCEDURE — 99999 PR PBB SHADOW E&M-EST. PATIENT-LVL V: CPT | Mod: PBBFAC,,, | Performed by: STUDENT IN AN ORGANIZED HEALTH CARE EDUCATION/TRAINING PROGRAM

## 2022-09-29 RX ORDER — VALSARTAN 80 MG/1
1 TABLET ORAL 2 TIMES DAILY
COMMUNITY
Start: 2022-03-08

## 2022-09-29 RX ORDER — LEVOCETIRIZINE DIHYDROCHLORIDE 5 MG/1
TABLET, FILM COATED ORAL
COMMUNITY
Start: 2022-08-08

## 2022-09-29 RX ORDER — OFLOXACIN 3 MG/ML
1 SOLUTION/ DROPS OPHTHALMIC 4 TIMES DAILY
COMMUNITY
Start: 2022-06-25

## 2022-09-29 RX ORDER — LANOLIN ALCOHOL/MO/W.PET/CERES
500 CREAM (GRAM) TOPICAL
COMMUNITY

## 2022-09-29 RX ORDER — ICOSAPENT ETHYL 500 MG/1
CAPSULE ORAL
COMMUNITY

## 2022-09-29 RX ORDER — FENOFIBRATE 134 MG/1
134 CAPSULE ORAL DAILY
COMMUNITY
Start: 2022-09-23

## 2022-09-29 RX ORDER — FERROUS SULFATE 325(65) MG
325 TABLET, DELAYED RELEASE (ENTERIC COATED) ORAL DAILY
Qty: 30 TABLET | Refills: 3 | Status: SHIPPED | OUTPATIENT
Start: 2022-09-29 | End: 2022-10-29

## 2022-09-29 RX ORDER — FENOFIBRATE 134 MG/1
1 CAPSULE ORAL EVERY MORNING
COMMUNITY
Start: 2022-03-28

## 2022-09-29 RX ORDER — ICOSAPENT ETHYL 1000 MG/1
1 CAPSULE ORAL 4 TIMES DAILY
COMMUNITY
Start: 2022-07-11

## 2022-09-29 RX ORDER — LEVOCETIRIZINE DIHYDROCHLORIDE 5 MG/1
1 TABLET, FILM COATED ORAL NIGHTLY
COMMUNITY
Start: 2022-01-24

## 2022-09-29 RX ORDER — BROMFENAC SODIUM 0.7 MG/ML
1 SOLUTION/ DROPS OPHTHALMIC NIGHTLY
COMMUNITY
Start: 2022-06-25

## 2022-09-29 RX ORDER — OMEPRAZOLE 40 MG/1
40 CAPSULE, DELAYED RELEASE ORAL DAILY
Qty: 30 CAPSULE | Refills: 11 | Status: SHIPPED | OUTPATIENT
Start: 2022-09-29 | End: 2023-09-29

## 2022-09-29 RX ORDER — METFORMIN HYDROCHLORIDE 1000 MG/1
1000 TABLET ORAL 2 TIMES DAILY WITH MEALS
COMMUNITY
Start: 2022-01-24

## 2022-09-29 RX ORDER — BROMFENAC SODIUM 0.7 MG/ML
SOLUTION/ DROPS OPHTHALMIC
COMMUNITY
Start: 2022-06-25

## 2022-09-29 RX ORDER — LOTEPREDNOL ETABONATE 3.8 MG/G
GEL OPHTHALMIC DAILY
COMMUNITY
Start: 2022-07-07

## 2022-09-29 NOTE — PROGRESS NOTES
[facility] Consultation Note    Reason for Consult:  chest pain, reflux     PCP:   Tiffany Adkins   No address on file    Referring MD:  Aaareferral Self  No address on file    HPI:  This is a 67 y.o. female here for evaluation of chest pain and worsening reflux. She has a PMHx of DM, CAD and HTN. She is following here after an ED visit and admission for chest pain 9[place]26 at [facility] [place] She has a pressure in her chest which is mostly constant and it is worse with eating. Has nocturnal heartburn symptoms as well. Takes H2 blocker BID which helps partially. At recent admission, ACS was ruled out with negative cardiac markers. She was seen by cardiology inpatient who ordered a Lexiscan stress test which was unremarkable. Echocardiogram was also done which was also unremarkable. CBC showed Hg 10. BMP showed K 5.2 and Cr 1.19. troponin was normal. No recent abdominal imaging to review. No prior endoscopy on file. Denied NSAID use. No FH of gastric of colon cancer. She is on on zantac currently. On plavix for AC. It was recommended on discharge that she follow with a GI doctor. Denied dysphagia, regurgitation, N/V, hematemesis or melena. BM are normal. No overt GI bleeding.     Was previously followed with Dr Rhodes at . Last EGD was 5 year ago but she doesn't remember the results. No history of ulcer or H pylori. Colonoscopy was around same time and removed a few polyps and said 5 years which should would be due for now.     ROS:  Constitutional: No fevers, chills, No weight loss  ENT: No allergies  CV: No chest pain  Pulm: No cough, No shortness of breath  Ophtho: No vision changes  GI: see HPI  Derm: No rash  Heme: No lymphadenopathy, No bruising  MSK: No arthritis  : No dysuria, No hematuria  Endo: No hot or cold intolerance  Neuro: No syncope, No seizure  Psych: No anxiety, No depression    Medical History:  has a past medical history of Coronary artery disease, Hyperlipidemia, and  Hypertension.    Surgical History:  has a past surgical history that includes  section; Tonsillectomy; Hysterectomy; Rotator cuff repair; Carpal tunnel release; and Breast surgery.    Family History: family history includes Cancer in her mother. She was adopted..        Social History:  reports that she has never smoked. She has never used smokeless tobacco. She reports current alcohol use. She reports that she does not use drugs.    Review of patient's allergies indicates:   Allergen Reactions    Morpholine analogues Itching    Penicillins Hives    Sulfa (sulfonamide antibiotics) Rash       Current Outpatient Rx   Medication Sig Dispense Refill    bromfenac (PROLENSA) 0.07 % Drop INSTILL 1 DROP INTO SURGICAL EYE AT BEDTIME. START 2 DAYS BEFORE SURGERY      fenofibrate micronized (LOFIBRA) 134 MG Cap Take 1 capsule by mouth every morning.      levocetirizine (XYZAL) 5 MG tablet Take 1 tablet by mouth every evening.      linaCLOtide (LINZESS) 145 mcg Cap capsule Take 1 capsule by mouth every morning.      loteprednol etabonate (LOTEMAX SM) 0.38 % DrpG once daily.      metFORMIN (GLUCOPHAGE) 1000 MG tablet Take 1,000 mg by mouth 2 (two) times daily with meals.      valsartan (DIOVAN) 80 MG tablet Take 1 tablet by mouth 2 (two) times daily.      ascorbic Acid (VITAMIN C) 500 mg CpSR Take 500 mg by mouth.      aspirin 81 MG Chew Take 81 mg by mouth once daily.      biotin 10,000 mcg Cap Take by mouth.      cholecalciferol, vitamin D3, (VITAMIN D3) 50 mcg (2,000 unit) Tab Take by mouth once daily.      citalopram (CELEXA) 20 MG tablet Take 1 tablet (20 mg total) by mouth once daily. 30 tablet 3    clopidogrel (PLAVIX) 75 mg tablet TK 1 T PO QD  1    doxycycline (VIBRAMYCIN) 100 MG Cap TK 1 C PO BID FOR 7 DAYS  0    enalapril (VASOTEC) 2.5 MG tablet Take 1 tablet (2.5 mg total) by mouth once daily. 30 tablet 3    fenofibrate micronized (LOFIBRA) 134 MG Cap Take 134 mg by mouth once daily.      hydrochlorothiazide  "(HYDRODIURIL) 12.5 MG Tab Take 1 tablet (12.5 mg total) by mouth once daily. 30 tablet 3    hydrocodone-acetaminophen 7.5-500 mg (LORTAB) 7.5-500 mg per tablet Take by mouth. 1 - 2 Tablet Oral Every 6 hours      icosapent ethyL (VASCEPA) 0.5 gram Cap Take by mouth.      icosapent ethyL (VASCEPA) 1 gram Cap Take 1 capsule by mouth 4 (four) times daily.      isosorbide mononitrate (IMDUR) 30 MG 24 hr tablet TK 1 T PO  BID  1    levocetirizine (XYZAL) 5 MG tablet SMARTSI Tablet(s) By Mouth Every Evening      lisinopril (PRINIVIL,ZESTRIL) 5 MG tablet TK 1 T PO QD  1    metformin (GLUCOPHAGE) 500 MG tablet Take 1 tablet (500 mg total) by mouth 2 (two) times daily with meals. 60 tablet 3    metoprolol succinate (TOPROL-XL) 25 MG 24 hr tablet Take 1 tablet (25 mg total) by mouth once daily. 30 tablet 3    nitroGLYCERIN (NITROSTAT) 0.4 MG SL tablet Place 1 tablet (0.4 mg total) under the tongue every 5 (five) minutes as needed for Chest pain. 25 tablet 6    ofloxacin (OCUFLOX) 0.3 % ophthalmic solution Place 1 drop into both eyes 4 (four) times daily.      omeprazole (PRILOSEC) 40 MG capsule Take 1 capsule (40 mg total) by mouth once daily. 30 capsule 11    PROLENSA 0.07 % Drop 1 drop every evening.      ranitidine (ZANTAC) 150 MG tablet TK 2 TS PO NIGHTLY  2    ranolazine (RANEXA) 500 MG Tb12 TK 1 T PO BID  1    rosuvastatin (CRESTOR) 20 MG tablet TK 1 T PO  QD  3    simvastatin (ZOCOR) 80 MG tablet Take 1 tablet (80 mg total) by mouth every evening. 30 tablet 3    zolpidem (AMBIEN) 10 mg Tab Take 10 mg by mouth.         Objective Findings:    Vital Signs:  /70   Pulse 76   Ht 5' 2" (1.575 m)   Wt 78 kg (172 lb)   BMI 31.46 kg/m²   Body mass index is 31.46 kg/m².    Physical Exam:  General Appearance: Well appearing in no acute distress  Head:   Normocephalic, without obvious abnormality  Eyes:    No scleral icterus, EOMI  ENT: Neck supple, Lips, mucosa, and tongue normal    Lungs: CTA bilaterally in anterior " and posterior fields, no wheezes, no crackles.  Heart:  Regular rate and rhythm, S1, S2 normal, no murmurs heard  Abdomen: Soft, non tender, non distended with positive bowel sounds in all four quadrants. No hepatosplenomegaly, ascites, or mass  Extremities: 2+ pulses, no clubbing, cyanosis or edema  Skin: No rash  Neurologic: no focal deficits       Labs:  Lab Results   Component Value Date    WBC 7.42 06/10/2013    HGB 13.5 06/10/2013    HCT 41.4 06/10/2013     06/10/2013    CHOL 184 06/10/2013    TRIG 188 (H) 06/10/2013    HDL 32 (L) 06/10/2013    ALT 36 06/10/2013    AST 34 06/10/2013     06/10/2013    K 4.9 06/10/2013     06/10/2013    CREATININE 0.8 06/10/2013    BUN 18 06/10/2013    CO2 22 (L) 06/10/2013    TSH 0.474 03/16/2012    HGBA1C 5.5 06/04/2021       Imaging: reviewed     Endoscopy:  none     Assessment:    Chest Pain   CAD   Normocytic Anemia   Hx colon polyps     Patient with recent worsening chest pain and reflux symptoms. Currently on H2 blocker. Has a CV history but had an extensive negative cardiac work up this month at [facility] [place]. There is concern for GI origin of symptoms. Will start PPI and discussed GERD modifications.      Recommendations:    - Iron panel with ferritin   - Start PPI, take on empty stomach 30 mins before meals   - H2 blocker for breakthrough   - GERD precautions discussed   - Plan for EGD due to concern for GI origin of symptoms and anemia. Patient has CAD history but recently normal troponin, Lexiscan stress and echo at [facility] [place] this month   - Cscope at same time as she is due for CRC surveillance     RTC after endoscopy       Order summary:  Orders Placed This Encounter    IRON AND TIBC    FERRITIN    Ambulatory referral/consult to Endo Procedure     omeprazole (PRILOSEC) 40 MG capsule         Thank you so much for allowing me to participate in the care of Kirti Flores MD  Gastroenterology   [facility]

## 2022-09-29 NOTE — PROCEDURES
"Kirti Caraballo is a 67 y.o. female patient.    Pulse: 76 (09/29/22 0816)  BP: 110/70 (09/29/22 0816)  Weight: 78 kg (172 lb) (09/29/22 0816)  Height: 5' 2" (157.5 cm) (09/29/22 0816)       Procedures    9/29/2022    "

## 2022-10-04 ENCOUNTER — TELEPHONE (OUTPATIENT)
Dept: ENDOSCOPY | Facility: HOSPITAL | Age: 67
End: 2022-10-04
Payer: MEDICARE

## 2022-10-04 NOTE — TELEPHONE ENCOUNTER
Contacted pt to schedule EGD. Pt is on Plavix. Plavix hold faxed to Dr. Chandan Akers -at [facility]. Scheduling pending reply.  Fax: [phone]  Phone: [phone]

## 2022-10-10 ENCOUNTER — TELEPHONE (OUTPATIENT)
Dept: ENDOSCOPY | Facility: HOSPITAL | Age: 67
End: 2022-10-10
Payer: MEDICARE

## 2022-10-20 ENCOUNTER — TELEPHONE (OUTPATIENT)
Dept: ENDOSCOPY | Facility: HOSPITAL | Age: 67
End: 2022-10-20
Payer: MEDICARE

## 2022-10-28 ENCOUNTER — CLINICAL SUPPORT (OUTPATIENT)
Dept: ENDOSCOPY | Facility: HOSPITAL | Age: 67
End: 2022-10-28
Attending: STUDENT IN AN ORGANIZED HEALTH CARE EDUCATION/TRAINING PROGRAM
Payer: MEDICARE

## 2022-10-28 VITALS — BODY MASS INDEX: 32.02 KG/M2 | WEIGHT: 174 LBS | HEIGHT: 62 IN

## 2022-10-28 DIAGNOSIS — K21.9 GASTROESOPHAGEAL REFLUX DISEASE, UNSPECIFIED WHETHER ESOPHAGITIS PRESENT: ICD-10-CM

## 2022-10-28 DIAGNOSIS — D64.9 ANEMIA, UNSPECIFIED TYPE: ICD-10-CM

## 2022-11-09 ENCOUNTER — HOSPITAL ENCOUNTER (OUTPATIENT)
Facility: HOSPITAL | Age: 67
Discharge: HOME | End: 2022-11-09
Attending: STUDENT IN AN ORGANIZED HEALTH CARE EDUCATION/TRAINING PROGRAM | Admitting: STUDENT IN AN ORGANIZED HEALTH CARE EDUCATION/TRAINING PROGRAM
Payer: MEDICARE

## 2022-11-09 ENCOUNTER — ANESTHESIA EVENT (OUTPATIENT)
Dept: ENDOSCOPY | Facility: HOSPITAL | Age: 67
End: 2022-11-09
Payer: MEDICARE

## 2022-11-09 ENCOUNTER — ANESTHESIA (OUTPATIENT)
Dept: ENDOSCOPY | Facility: HOSPITAL | Age: 67
End: 2022-11-09
Payer: MEDICARE

## 2022-11-09 VITALS
HEART RATE: 87 BPM | SYSTOLIC BLOOD PRESSURE: 161 MMHG | RESPIRATION RATE: 16 BRPM | OXYGEN SATURATION: 96 % | TEMPERATURE: 99 F | DIASTOLIC BLOOD PRESSURE: 84 MMHG

## 2022-11-09 DIAGNOSIS — D64.9 ANEMIA, UNSPECIFIED TYPE: ICD-10-CM

## 2022-11-09 DIAGNOSIS — Z12.11 COLON CANCER SCREENING: Primary | ICD-10-CM

## 2022-11-09 LAB — POCT GLUCOSE: 178 MG/DL (ref 70–110)

## 2022-11-09 PROCEDURE — 43239 EGD BIOPSY SINGLE/MULTIPLE: CPT | Mod: ,,, | Performed by: STUDENT IN AN ORGANIZED HEALTH CARE EDUCATION/TRAINING PROGRAM

## 2022-11-09 PROCEDURE — 88305 TISSUE EXAM BY PATHOLOGIST: CPT | Performed by: PATHOLOGY

## 2022-11-09 PROCEDURE — D9220A PRA ANESTHESIA: ICD-10-PCS | Mod: CRNA,,, | Performed by: NURSE ANESTHETIST, CERTIFIED REGISTERED

## 2022-11-09 PROCEDURE — 43239 PR EGD, FLEX, W/BIOPSY, SGL/MULTI: ICD-10-PCS | Mod: ,,, | Performed by: STUDENT IN AN ORGANIZED HEALTH CARE EDUCATION/TRAINING PROGRAM

## 2022-11-09 PROCEDURE — 88305 TISSUE EXAM BY PATHOLOGIST: CPT | Mod: 26,,, | Performed by: PATHOLOGY

## 2022-11-09 PROCEDURE — 27201012 HC FORCEPS, HOT/COLD, DISP: Performed by: STUDENT IN AN ORGANIZED HEALTH CARE EDUCATION/TRAINING PROGRAM

## 2022-11-09 PROCEDURE — 43239 EGD BIOPSY SINGLE/MULTIPLE: CPT | Performed by: STUDENT IN AN ORGANIZED HEALTH CARE EDUCATION/TRAINING PROGRAM

## 2022-11-09 PROCEDURE — 25000003 PHARM REV CODE 250: Performed by: STUDENT IN AN ORGANIZED HEALTH CARE EDUCATION/TRAINING PROGRAM

## 2022-11-09 PROCEDURE — 82962 GLUCOSE BLOOD TEST: CPT | Performed by: STUDENT IN AN ORGANIZED HEALTH CARE EDUCATION/TRAINING PROGRAM

## 2022-11-09 PROCEDURE — 37000008 HC ANESTHESIA 1ST 15 MINUTES: Performed by: STUDENT IN AN ORGANIZED HEALTH CARE EDUCATION/TRAINING PROGRAM

## 2022-11-09 PROCEDURE — 37000009 HC ANESTHESIA EA ADD 15 MINS: Performed by: STUDENT IN AN ORGANIZED HEALTH CARE EDUCATION/TRAINING PROGRAM

## 2022-11-09 PROCEDURE — 88305 TISSUE EXAM BY PATHOLOGIST: ICD-10-PCS | Mod: 26,,, | Performed by: PATHOLOGY

## 2022-11-09 PROCEDURE — D9220A PRA ANESTHESIA: ICD-10-PCS | Mod: ANES,,, | Performed by: ANESTHESIOLOGY

## 2022-11-09 PROCEDURE — D9220A PRA ANESTHESIA: Mod: ANES,,, | Performed by: ANESTHESIOLOGY

## 2022-11-09 PROCEDURE — 25000003 PHARM REV CODE 250: Performed by: NURSE ANESTHETIST, CERTIFIED REGISTERED

## 2022-11-09 PROCEDURE — D9220A PRA ANESTHESIA: Mod: CRNA,,, | Performed by: NURSE ANESTHETIST, CERTIFIED REGISTERED

## 2022-11-09 PROCEDURE — 63600175 PHARM REV CODE 636 W HCPCS: Performed by: NURSE ANESTHETIST, CERTIFIED REGISTERED

## 2022-11-09 RX ORDER — PROPOFOL 10 MG/ML
INJECTION, EMULSION INTRAVENOUS
Status: DISCONTINUED
Start: 2022-11-09 | End: 2022-11-09 | Stop reason: HOSPADM

## 2022-11-09 RX ORDER — ETOMIDATE 2 MG/ML
INJECTION INTRAVENOUS
Status: DISCONTINUED | OUTPATIENT
Start: 2022-11-09 | End: 2022-11-09

## 2022-11-09 RX ORDER — LIDOCAINE HYDROCHLORIDE 20 MG/ML
INJECTION, SOLUTION EPIDURAL; INFILTRATION; INTRACAUDAL; PERINEURAL
Status: DISCONTINUED
Start: 2022-11-09 | End: 2022-11-09 | Stop reason: HOSPADM

## 2022-11-09 RX ORDER — ETOMIDATE 2 MG/ML
INJECTION INTRAVENOUS
Status: DISCONTINUED
Start: 2022-11-09 | End: 2022-11-09 | Stop reason: HOSPADM

## 2022-11-09 RX ORDER — SODIUM CHLORIDE 9 MG/ML
INJECTION, SOLUTION INTRAVENOUS CONTINUOUS
Status: DISCONTINUED | OUTPATIENT
Start: 2022-11-09 | End: 2022-11-09 | Stop reason: HOSPADM

## 2022-11-09 RX ORDER — ESMOLOL HYDROCHLORIDE 10 MG/ML
INJECTION INTRAVENOUS
Status: DISCONTINUED
Start: 2022-11-09 | End: 2022-11-09 | Stop reason: HOSPADM

## 2022-11-09 RX ORDER — LIDOCAINE HYDROCHLORIDE 20 MG/ML
INJECTION INTRAVENOUS
Status: DISCONTINUED | OUTPATIENT
Start: 2022-11-09 | End: 2022-11-09

## 2022-11-09 RX ORDER — PROPOFOL 10 MG/ML
VIAL (ML) INTRAVENOUS
Status: DISCONTINUED | OUTPATIENT
Start: 2022-11-09 | End: 2022-11-09

## 2022-11-09 RX ADMIN — ETOMIDATE 10 MG: 2 INJECTION, SOLUTION INTRAVENOUS at 11:11

## 2022-11-09 RX ADMIN — LIDOCAINE HYDROCHLORIDE 100 MG: 20 INJECTION, SOLUTION INTRAVENOUS at 11:11

## 2022-11-09 RX ADMIN — PROPOFOL 100 MG: 10 INJECTION, EMULSION INTRAVENOUS at 10:11

## 2022-11-09 RX ADMIN — PROPOFOL 100 MG: 10 INJECTION, EMULSION INTRAVENOUS at 11:11

## 2022-11-09 RX ADMIN — ESMOLOL HYDROCHLORIDE 20 MCG: 100 INJECTION, SOLUTION INTRAVENOUS at 11:11

## 2022-11-09 RX ADMIN — SODIUM CHLORIDE: 0.9 INJECTION, SOLUTION INTRAVENOUS at 11:11

## 2022-11-09 NOTE — TRANSFER OF CARE
Anesthesia Transfer of Care Note    Patient: Kirti Caraballo    Procedure(s) Performed: Procedure(s) (LRB):  ESOPHAGOGASTRODUODENOSCOPY (EGD) (N/A)    Patient location: GI    Anesthesia Type: general    Transport from OR: Transported from OR on 6-10 L/min O2 by face mask with adequate spontaneous ventilation    Post pain: adequate analgesia    Post assessment: no apparent anesthetic complications    Post vital signs: stable    Level of consciousness: sedated    Nausea/Vomiting: no nausea/vomiting    Complications: none    Transfer of care protocol was followed      Last vitals:   Visit Vitals  BP (!) 187/88 (BP Location: Left arm, Patient Position: Lying)   Pulse 91   Temp 37.2 °C (99 °F) (Oral)   Resp 18   SpO2 98%   Breastfeeding No

## 2022-11-09 NOTE — PROVATION PATIENT INSTRUCTIONS
Discharge Summary/Instructions after an Endoscopic Procedure  Patient Name: Kirti Caraballo  Patient MRN: [id number]  Patient YOB: 1955 Wednesday, November 9, 2022  Maribell Flores MD  Dear patient,  As a result of recent federal legislation (The Federal Cures Act), you may   receive lab or pathology results from your procedure in your [facility]   account before your physician is able to contact you. Your physician or   their representative will relay the results to you with their   recommendations at their soonest availability.  Thank you,  RESTRICTIONS:  During your procedure today, you received medications for sedation.  These   medications may affect your judgment, balance and coordination.  Therefore,   for 24 hours, you have the following restrictions:   - DO NOT drive a car, operate machinery, make legal/financial decisions,   sign important papers or drink alcohol.    ACTIVITY:  Today: no heavy lifting, straining or running due to procedural   sedation/anesthesia.  The following day: return to full activity including work.  DIET:  Eat and drink normally unless instructed otherwise.     TREATMENT FOR COMMON SIDE EFFECTS:  - Mild abdominal pain, nausea, belching, bloating or excessive gas:  rest,   eat lightly and use a heating pad.  - Sore Throat: treat with throat lozenges and/or gargle with warm salt   water.  - Because air was used during the procedure, expelling large amounts of air   from your rectum or belching is normal.  - If a bowel prep was taken, you may not have a bowel movement for 1-3 days.    This is normal.  SYMPTOMS TO WATCH FOR AND REPORT TO YOUR PHYSICIAN:  1. Abdominal pain or bloating, other than gas cramps.  2. Chest pain.  3. Back pain.  4. Signs of infection such as: chills or fever occurring within 24 hours   after the procedure.  5. Rectal bleeding, which would show as bright red, maroon, or black stools.   (A tablespoon of blood from the rectum is not serious, especially  if   hemorrhoids are present.)  6. Vomiting.  7. Weakness or dizziness.  GO DIRECTLY TO THE NEAREST EMERGENCY ROOM IF YOU HAVE ANY OF THE FOLLOWING:      Difficulty breathing              Chills and/or fever over 101 F   Persistent vomiting and/or vomiting blood   Severe abdominal pain   Severe chest pain   Black, tarry stools   Bleeding- more than one tablespoon   Any other symptom or condition that you feel may need urgent attention  Your doctor recommends these additional instructions:  If any biopsies were taken, your doctors clinic will contact you in 1 to 2   weeks with any results.  - Discharge patient to home (ambulatory).   - Resume regular diet.   - Continue present medications.   - Await pathology results.  For questions, problems or results please call your physician - Maribell Flores MD at Work:  [phone].  [facility] can be reached at [phone]     IF A COMPLICATION OR EMERGENCY SITUATION ARISES AND YOU ARE UNABLE TO REACH   YOUR PHYSICIAN - GO DIRECTLY TO THE EMERGENCY ROOM.  Maribell Flores MD  11/9/2022 11:33:25 AM  This report has been verified and signed electronically.  Dear patient,  As a result of recent federal legislation (The Federal Cures Act), you may   receive lab or pathology results from your procedure in your [facility]   account before your physician is able to contact you. Your physician or   their representative will relay the results to you with their   recommendations at their soonest availability.  Thank you,  PROVATION

## 2022-11-09 NOTE — H&P
Short Stay Endoscopy History and Physical    PCP - Tiffany Adkins MD  Referring Physician - Maribell Flores MD  [address] [place] [address]    Procedure - EGD  ASA - per anesthesia  Mallampati - per anesthesia  History of Anesthesia problems - no  Family history Anesthesia problems -  no   Plan of anesthesia - General    HPI  67 y.o. female  Reason for procedure:  Anemia, unspecified type [D64.9]  Gastroesophageal reflux disease, unspecified whether esophagitis present [K21.9]    ROS:  Constitutional: No fevers, chills, No weight loss  CV: No chest pain  Pulm: No cough, No shortness of breath  GI: see HPI    Medical History:  has a past medical history of Coronary artery disease, Hyperlipidemia, and Hypertension.    Surgical History:  has a past surgical history that includes  section; Tonsillectomy; Hysterectomy; Rotator cuff repair; Carpal tunnel release; and Breast surgery.    Family History: family history includes Cancer in her mother. She was adopted..    Social History:  reports that she has never smoked. She has never used smokeless tobacco. She reports current alcohol use. She reports that she does not use drugs.    Review of patient's allergies indicates:   Allergen Reactions    Morpholine analogues Itching    Penicillins Hives    Sulfa (sulfonamide antibiotics) Rash       Medications:   Facility-Administered Medications Prior to Admission   Medication Dose Route Frequency Provider Last Rate Last Admin    acetaminophen tablet 650 mg  650 mg Oral Once PRN Messi Bella MD        albuterol inhaler 2 puff  2 puff Inhalation Q20 Min PRN Messi Bella MD        diphenhydrAMINE injection 25 mg  25 mg Intravenous Once PRN Messi Bella MD        EPINEPHrine (EPIPEN) 0.3 mg/0.3 mL pen injection 0.3 mg  0.3 mg Intramuscular PRN Messi Bella MD        methylPREDNISolone sodium succinate injection 40 mg  40 mg Intravenous Once PRN Messi Bella MD         ondansetron disintegrating tablet 4 mg  4 mg Oral Once PRN Messi Bella MD        sodium chloride 0.9% 500 mL flush bag   Intravenous PRN Messi Bella MD        sodium chloride 0.9% flush 10 mL  10 mL Intravenous PRN Messi Bella MD         Medications Prior to Admission   Medication Sig Dispense Refill Last Dose    aspirin 81 MG Chew Take 81 mg by mouth once daily.   Past Week    clopidogrel (PLAVIX) 75 mg tablet TK 1 T PO QD  1 Past Week    ascorbic Acid (VITAMIN C) 500 mg CpSR Take 500 mg by mouth.       biotin 10,000 mcg Cap Take by mouth.       bromfenac (PROLENSA) 0.07 % Drop INSTILL 1 DROP INTO SURGICAL EYE AT BEDTIME. START 2 DAYS BEFORE SURGERY       cholecalciferol, vitamin D3, (VITAMIN D3) 50 mcg (2,000 unit) Tab Take by mouth once daily.       citalopram (CELEXA) 20 MG tablet Take 1 tablet (20 mg total) by mouth once daily. 30 tablet 3     doxycycline (VIBRAMYCIN) 100 MG Cap TK 1 C PO BID FOR 7 DAYS  0     enalapril (VASOTEC) 2.5 MG tablet Take 1 tablet (2.5 mg total) by mouth once daily. 30 tablet 3     fenofibrate micronized (LOFIBRA) 134 MG Cap Take 134 mg by mouth once daily.       fenofibrate micronized (LOFIBRA) 134 MG Cap Take 1 capsule by mouth every morning.       hydrochlorothiazide (HYDRODIURIL) 12.5 MG Tab Take 1 tablet (12.5 mg total) by mouth once daily. 30 tablet 3     hydrocodone-acetaminophen 7.5-500 mg (LORTAB) 7.5-500 mg per tablet Take by mouth. 1 - 2 Tablet Oral Every 6 hours       icosapent ethyL (VASCEPA) 0.5 gram Cap Take by mouth.       icosapent ethyL (VASCEPA) 1 gram Cap Take 1 capsule by mouth 4 (four) times daily.       isosorbide mononitrate (IMDUR) 30 MG 24 hr tablet TK 1 T PO  BID  1     levocetirizine (XYZAL) 5 MG tablet Take 1 tablet by mouth every evening.       levocetirizine (XYZAL) 5 MG tablet SMARTSI Tablet(s) By Mouth Every Evening       linaCLOtide (LINZESS) 145 mcg Cap capsule Take 1 capsule by mouth every morning.       lisinopril  (PRINIVIL,ZESTRIL) 5 MG tablet TK 1 T PO QD  1     loteprednol etabonate (LOTEMAX SM) 0.38 % DrpG once daily.       metFORMIN (GLUCOPHAGE) 1000 MG tablet Take 1,000 mg by mouth 2 (two) times daily with meals.       metformin (GLUCOPHAGE) 500 MG tablet Take 1 tablet (500 mg total) by mouth 2 (two) times daily with meals. 60 tablet 3     metoprolol succinate (TOPROL-XL) 25 MG 24 hr tablet Take 1 tablet (25 mg total) by mouth once daily. 30 tablet 3     nitroGLYCERIN (NITROSTAT) 0.4 MG SL tablet Place 1 tablet (0.4 mg total) under the tongue every 5 (five) minutes as needed for Chest pain. 25 tablet 6     ofloxacin (OCUFLOX) 0.3 % ophthalmic solution Place 1 drop into both eyes 4 (four) times daily.       omeprazole (PRILOSEC) 40 MG capsule Take 1 capsule (40 mg total) by mouth once daily. 30 capsule 11     PROLENSA 0.07 % Drop 1 drop every evening.       ranitidine (ZANTAC) 150 MG tablet TK 2 TS PO NIGHTLY  2     ranolazine (RANEXA) 500 MG Tb12 TK 1 T PO BID  1     rosuvastatin (CRESTOR) 20 MG tablet TK 1 T PO  QD  3     simvastatin (ZOCOR) 80 MG tablet Take 1 tablet (80 mg total) by mouth every evening. 30 tablet 3     valsartan (DIOVAN) 80 MG tablet Take 1 tablet by mouth 2 (two) times daily.       zolpidem (AMBIEN) 10 mg Tab Take 10 mg by mouth.          Physical Exam:    Vital Signs:   Vitals:    11/09/22 1052   BP: (!) 173/86   Pulse:    Resp:    Temp:        General Appearance: Well appearing in no acute distress  Abdomen: Soft, non tender, non distended with normal bowel sounds, no masses      Labs:  Lab Results   Component Value Date    WBC 7.42 06/10/2013    HGB 13.5 06/10/2013    HCT 41.4 06/10/2013     06/10/2013    CHOL 184 06/10/2013    TRIG 188 (H) 06/10/2013    HDL 32 (L) 06/10/2013    ALT 36 06/10/2013    AST 34 06/10/2013     06/10/2013    K 4.9 06/10/2013     06/10/2013    CREATININE 0.8 06/10/2013    BUN 18 06/10/2013    CO2 22 (L) 06/10/2013    TSH 0.474 03/16/2012    HGBA1C 5.5  06/04/2021       I have explained the risks and benefits of this endoscopic procedure to the patient including but not limited to bleeding, inflammation, infection, perforation, and death.    Assessment/Plan:     GERD   RED     - Proceed with EGD       Maribell Flores MD  Gastroenterology   [facility]

## 2022-11-09 NOTE — ANESTHESIA POSTPROCEDURE EVALUATION
Anesthesia Post Evaluation    Patient: Kirti Caraballo    Procedure(s) Performed: Procedure(s) (LRB):  ESOPHAGOGASTRODUODENOSCOPY (EGD) (N/A)    Final Anesthesia Type: general      Patient location during evaluation: GI PACU  Patient participation: Yes- Able to Participate  Level of consciousness: awake and alert, oriented and awake  Post-procedure vital signs: reviewed and stable  Airway patency: patent    PONV status at discharge: No PONV  Anesthetic complications: no      Cardiovascular status: blood pressure returned to baseline  Respiratory status: unassisted, spontaneous ventilation and room air  Hydration status: euvolemic  Follow-up not needed.          Vitals Value Taken Time   /84 11/09/22 1149   Temp 37.2 °C (99 °F) 11/09/22 1140   Pulse 93 11/09/22 1157   Resp 19 11/09/22 1157   SpO2 96 % 11/09/22 1157   Vitals shown include unvalidated device data.      No case tracking events are documented in the log.      Pain/Devendra Score: Devendra Score: 10 (11/9/2022 11:49 AM)

## 2022-11-09 NOTE — ANESTHESIA PREPROCEDURE EVALUATION
11/09/2022  Kirti Caraballo is a 67 y.o., female.      Pre-op Assessment    I have reviewed the Patient Summary Reports.     I have reviewed the Nursing Notes.    I have reviewed the Medications.     Review of Systems  Anesthesia Hx:  No problems with previous Anesthesia Denies Hx of Anesthetic complications  Neg history of prior surgery. Denies Family Hx of Anesthesia complications.   Denies Personal Hx of Anesthesia complications.   Social:  Non-Smoker, No Alcohol Use    Hematology/Oncology:  Hematology Normal   Oncology Normal     EENT/Dental:EENT/Dental Normal   Cardiovascular:   Exercise tolerance: good Hypertension CAD   hyperlipidemia    Pulmonary:  Pulmonary Normal    Renal/:  Renal/ Normal     Hepatic/GI:  Hepatic/GI Normal    Musculoskeletal:  Musculoskeletal Normal    Neurological:  Neurology Normal    Endocrine:   Diabetes, type 2    Dermatological:  Skin Normal    Psych:   Psychiatric History          Physical Exam  General: Well nourished    Airway:  Mallampati: II   Mouth Opening: Normal  Tongue: Normal  Neck ROM: Normal ROM    Dental:  Intact    Chest/Lungs:  Clear to auscultation    Heart:  Rate: Normal  Rhythm: Regular Rhythm  Sounds: Normal        Anesthesia Plan  Type of Anesthesia, risks & benefits discussed:    Anesthesia Type: Gen Natural Airway  Intra-op Monitoring Plan: Standard ASA Monitors  Induction:  IV  Informed Consent: Informed consent signed with the Patient and all parties understand the risks and agree with anesthesia plan.  All questions answered. Patient consented to blood products? Yes  ASA Score: 3    Ready For Surgery From Anesthesia Perspective.     .

## 2022-11-11 LAB
FINAL PATHOLOGIC DIAGNOSIS: NORMAL
GROSS: NORMAL
Lab: NORMAL

## 2022-12-20 ENCOUNTER — CLINICAL SUPPORT (OUTPATIENT)
Dept: ENDOSCOPY | Facility: HOSPITAL | Age: 67
End: 2022-12-20
Attending: STUDENT IN AN ORGANIZED HEALTH CARE EDUCATION/TRAINING PROGRAM
Payer: MEDICARE

## 2022-12-20 DIAGNOSIS — Z12.11 COLON CANCER SCREENING: ICD-10-CM

## 2022-12-20 NOTE — PLAN OF CARE
Patient is on Plavix that is managed by Dr. Ba Akers at [facility] and sees cardiologu Dr. Saunders at [facility] also.  Will fax over request to hold Plavix 5 days and records. Patient set up for new PAT appt

## 2022-12-29 ENCOUNTER — TELEPHONE (OUTPATIENT)
Dept: ENDOSCOPY | Facility: HOSPITAL | Age: 67
End: 2022-12-29
Payer: MEDICARE

## 2022-12-29 NOTE — TELEPHONE ENCOUNTER
2nd attempt fax request to hold Plavix 5 days and records for endoscopy procedure sent [phone]

## 2022-12-30 ENCOUNTER — PATIENT MESSAGE (OUTPATIENT)
Dept: ENDOSCOPY | Facility: HOSPITAL | Age: 67
End: 2022-12-30
Payer: MEDICARE

## 2023-01-24 ENCOUNTER — TELEPHONE (OUTPATIENT)
Dept: ORTHOPEDICS | Facility: CLINIC | Age: 68
End: 2023-01-24

## 2023-01-24 DIAGNOSIS — M25.562 LEFT KNEE PAIN, UNSPECIFIED CHRONICITY: Primary | ICD-10-CM

## 2023-01-26 ENCOUNTER — CLINICAL SUPPORT (OUTPATIENT)
Dept: ENDOSCOPY | Facility: HOSPITAL | Age: 68
End: 2023-01-26
Attending: STUDENT IN AN ORGANIZED HEALTH CARE EDUCATION/TRAINING PROGRAM
Payer: MEDICARE

## 2023-01-26 DIAGNOSIS — Z12.11 COLON CANCER SCREENING: ICD-10-CM

## 2023-01-26 NOTE — PLAN OF CARE
Pt didn't complete cardio appt with Dr. Saunders on 1/16/23 as planned due to death in family.  She does not have it rescheduled yet. Explained that we cannot get clearance until she sees Dr. Saunders.  She will give us a call back and let us know when she gets an appt that way we can set up new PAT for after

## 2023-01-27 ENCOUNTER — OFFICE VISIT (OUTPATIENT)
Dept: ORTHOPEDICS | Facility: CLINIC | Age: 68
End: 2023-01-27
Payer: MEDICARE

## 2023-01-27 DIAGNOSIS — M25.562 LEFT KNEE PAIN, UNSPECIFIED CHRONICITY: Primary | ICD-10-CM

## 2023-01-27 PROCEDURE — 1160F PR REVIEW ALL MEDS BY PRESCRIBER/CLIN PHARMACIST DOCUMENTED: ICD-10-PCS | Mod: CPTII,S$GLB,, | Performed by: ORTHOPAEDIC SURGERY

## 2023-01-27 PROCEDURE — 1101F PT FALLS ASSESS-DOCD LE1/YR: CPT | Mod: CPTII,S$GLB,, | Performed by: ORTHOPAEDIC SURGERY

## 2023-01-27 PROCEDURE — 99999 PR PBB SHADOW E&M-EST. PATIENT-LVL III: ICD-10-PCS | Mod: PBBFAC,,, | Performed by: ORTHOPAEDIC SURGERY

## 2023-01-27 PROCEDURE — 1159F PR MEDICATION LIST DOCUMENTED IN MEDICAL RECORD: ICD-10-PCS | Mod: CPTII,S$GLB,, | Performed by: ORTHOPAEDIC SURGERY

## 2023-01-27 PROCEDURE — 1125F PR PAIN SEVERITY QUANTIFIED, PAIN PRESENT: ICD-10-PCS | Mod: CPTII,S$GLB,, | Performed by: ORTHOPAEDIC SURGERY

## 2023-01-27 PROCEDURE — 99999 PR PBB SHADOW E&M-EST. PATIENT-LVL III: CPT | Mod: PBBFAC,,, | Performed by: ORTHOPAEDIC SURGERY

## 2023-01-27 PROCEDURE — 3288F PR FALLS RISK ASSESSMENT DOCUMENTED: ICD-10-PCS | Mod: CPTII,S$GLB,, | Performed by: ORTHOPAEDIC SURGERY

## 2023-01-27 PROCEDURE — 1125F AMNT PAIN NOTED PAIN PRSNT: CPT | Mod: CPTII,S$GLB,, | Performed by: ORTHOPAEDIC SURGERY

## 2023-01-27 PROCEDURE — 3288F FALL RISK ASSESSMENT DOCD: CPT | Mod: CPTII,S$GLB,, | Performed by: ORTHOPAEDIC SURGERY

## 2023-01-27 PROCEDURE — 1160F RVW MEDS BY RX/DR IN RCRD: CPT | Mod: CPTII,S$GLB,, | Performed by: ORTHOPAEDIC SURGERY

## 2023-01-27 PROCEDURE — 1101F PR PT FALLS ASSESS DOC 0-1 FALLS W/OUT INJ PAST YR: ICD-10-PCS | Mod: CPTII,S$GLB,, | Performed by: ORTHOPAEDIC SURGERY

## 2023-01-27 PROCEDURE — 1159F MED LIST DOCD IN RCRD: CPT | Mod: CPTII,S$GLB,, | Performed by: ORTHOPAEDIC SURGERY

## 2023-01-27 PROCEDURE — 99213 OFFICE O/P EST LOW 20 MIN: CPT | Mod: S$GLB,,, | Performed by: ORTHOPAEDIC SURGERY

## 2023-01-27 PROCEDURE — 99213 PR OFFICE/OUTPT VISIT, EST, LEVL III, 20-29 MIN: ICD-10-PCS | Mod: S$GLB,,, | Performed by: ORTHOPAEDIC SURGERY

## 2023-01-27 RX ORDER — FERROUS SULFATE TAB 325 MG (65 MG ELEMENTAL FE) 325 (65 FE) MG
TAB ORAL
COMMUNITY
Start: 2023-01-15 | End: 2023-02-05 | Stop reason: SDUPTHER

## 2023-01-27 RX ORDER — QUETIAPINE FUMARATE 100 MG/1
100 TABLET, FILM COATED ORAL NIGHTLY
COMMUNITY
Start: 2023-01-27

## 2023-01-27 RX ORDER — PANTOPRAZOLE SODIUM 40 MG/1
40 TABLET, DELAYED RELEASE ORAL
COMMUNITY
Start: 2023-01-11

## 2023-01-27 NOTE — PROGRESS NOTES
Chief Complaint   Patient presents with    Left Knee - Pain        HPI (01/27/2023): Kirti Caraballo is a 67 y.o. female who presents today complaining of left knee pain  Duration of symptoms:  about 2 months  Progressively worsening   Trauma or new activity: no trauma, was recently in [place] for a month with family, walked up and down stairs a lot more than unusal  Pain is constant  Aggravating factors: weight bearing activity, pain when she gets up and first straightens out her leg   Relieving factors: rest   Radicular symptoms: no numbness, paresthesias   Associated symptoms: no  swelling.    Prior treatment:  None   Pain does interfere with activities of daily living .    This is the extent of the patient's complaints at this time.        Review of patient's allergies indicates:   Allergen Reactions    Morpholine analogues Itching    Penicillins Hives    Sulfa (sulfonamide antibiotics) Rash         Current Outpatient Medications:     ascorbic Acid (VITAMIN C) 500 mg CpSR, Take 500 mg by mouth., Disp: , Rfl:     aspirin 81 MG Chew, Take 81 mg by mouth once daily., Disp: , Rfl:     cholecalciferol, vitamin D3, (VITAMIN D3) 50 mcg (2,000 unit) Tab, Take by mouth once daily., Disp: , Rfl:     citalopram (CELEXA) 20 MG tablet, Take 1 tablet (20 mg total) by mouth once daily., Disp: 30 tablet, Rfl: 3    clopidogrel (PLAVIX) 75 mg tablet, TK 1 T PO QD, Disp: , Rfl: 1    enalapril (VASOTEC) 2.5 MG tablet, Take 1 tablet (2.5 mg total) by mouth once daily., Disp: 30 tablet, Rfl: 3    fenofibrate micronized (LOFIBRA) 134 MG Cap, Take 134 mg by mouth once daily., Disp: , Rfl:     FEROSUL 325 mg (65 mg iron) Tab tablet, Take by mouth., Disp: , Rfl:     hydrochlorothiazide (HYDRODIURIL) 12.5 MG Tab, Take 1 tablet (12.5 mg total) by mouth once daily., Disp: 30 tablet, Rfl: 3    isosorbide mononitrate (IMDUR) 30 MG 24 hr tablet, TK 1 T PO  BID, Disp: , Rfl: 1    levocetirizine (XYZAL) 5 MG tablet, Take 1 tablet by mouth every  evening., Disp: , Rfl:     linaCLOtide (LINZESS) 145 mcg Cap capsule, Take 1 capsule by mouth every morning., Disp: , Rfl:     lisinopril (PRINIVIL,ZESTRIL) 5 MG tablet, TK 1 T PO QD, Disp: , Rfl: 1    metFORMIN (GLUCOPHAGE) 1000 MG tablet, Take 1,000 mg by mouth 2 (two) times daily with meals., Disp: , Rfl:     metformin (GLUCOPHAGE) 500 MG tablet, Take 1 tablet (500 mg total) by mouth 2 (two) times daily with meals., Disp: 60 tablet, Rfl: 3    metoprolol succinate (TOPROL-XL) 25 MG 24 hr tablet, Take 1 tablet (25 mg total) by mouth once daily., Disp: 30 tablet, Rfl: 3    pantoprazole (PROTONIX) 40 MG tablet, Take 40 mg by mouth., Disp: , Rfl:     QUEtiapine (SEROQUEL) 100 MG Tab, Take 100 mg by mouth every evening., Disp: , Rfl:     ranitidine (ZANTAC) 150 MG tablet, TK 2 TS PO NIGHTLY, Disp: , Rfl: 2    ranolazine (RANEXA) 500 MG Tb12, TK 1 T PO BID, Disp: , Rfl: 1    rosuvastatin (CRESTOR) 20 MG tablet, TK 1 T PO  QD, Disp: , Rfl: 3    simvastatin (ZOCOR) 80 MG tablet, Take 1 tablet (80 mg total) by mouth every evening., Disp: 30 tablet, Rfl: 3    valsartan (DIOVAN) 80 MG tablet, Take 1 tablet by mouth 2 (two) times daily., Disp: , Rfl:     biotin 10,000 mcg Cap, Take by mouth., Disp: , Rfl:     bromfenac (PROLENSA) 0.07 % Drop, INSTILL 1 DROP INTO SURGICAL EYE AT BEDTIME. START 2 DAYS BEFORE SURGERY, Disp: , Rfl:     doxycycline (VIBRAMYCIN) 100 MG Cap, TK 1 C PO BID FOR 7 DAYS, Disp: , Rfl: 0    fenofibrate micronized (LOFIBRA) 134 MG Cap, Take 1 capsule by mouth every morning., Disp: , Rfl:     hydrocodone-acetaminophen 7.5-500 mg (LORTAB) 7.5-500 mg per tablet, Take by mouth. 1 - 2 Tablet Oral Every 6 hours, Disp: , Rfl:     icosapent ethyL (VASCEPA) 0.5 gram Cap, Take by mouth., Disp: , Rfl:     icosapent ethyL (VASCEPA) 1 gram Cap, Take 1 capsule by mouth 4 (four) times daily., Disp: , Rfl:     levocetirizine (XYZAL) 5 MG tablet, SMARTSI Tablet(s) By Mouth Every Evening, Disp: , Rfl:     loteprednol  etabonate (LOTEMAX SM) 0.38 % DrpG, once daily., Disp: , Rfl:     nitroGLYCERIN (NITROSTAT) 0.4 MG SL tablet, Place 1 tablet (0.4 mg total) under the tongue every 5 (five) minutes as needed for Chest pain., Disp: 25 tablet, Rfl: 6    ofloxacin (OCUFLOX) 0.3 % ophthalmic solution, Place 1 drop into both eyes 4 (four) times daily., Disp: , Rfl:     omeprazole (PRILOSEC) 40 MG capsule, Take 1 capsule (40 mg total) by mouth once daily., Disp: 30 capsule, Rfl: 11    PROLENSA 0.07 % Drop, 1 drop every evening., Disp: , Rfl:     zolpidem (AMBIEN) 10 mg Tab, Take 10 mg by mouth., Disp: , Rfl:     Current Facility-Administered Medications:     acetaminophen tablet 650 mg, 650 mg, Oral, Once PRN, Messi Bella MD    albuterol inhaler 2 puff, 2 puff, Inhalation, Q20 Min PRN, Messi Bella MD    diphenhydrAMINE injection 25 mg, 25 mg, Intravenous, Once PRN, Messi Bella MD    EPINEPHrine (EPIPEN) 0.3 mg/0.3 mL pen injection 0.3 mg, 0.3 mg, Intramuscular, PRN, Messi Bella MD    methylPREDNISolone sodium succinate injection 40 mg, 40 mg, Intravenous, Once PRN, Messi Bella MD    ondansetron disintegrating tablet 4 mg, 4 mg, Oral, Once PRN, Messi Bella MD    sodium chloride 0.9% 500 mL flush bag, , Intravenous, PRN, Messi Bella MD    sodium chloride 0.9% flush 10 mL, 10 mL, Intravenous, PRN, Messi eBlla MD    Past Medical History:   Diagnosis Date    Coronary artery disease     Hyperlipidemia     Hypertension        Patient Active Problem List   Diagnosis    HTN (hypertension)    Hyperlipidemia    Depression    Coronary artery disease    DM (diabetes mellitus)    Contusion of left knee and lower leg       Past Surgical History:   Procedure Laterality Date    BREAST SURGERY      benign cyst    CARPAL TUNNEL RELEASE       SECTION      ESOPHAGOGASTRODUODENOSCOPY N/A 2022    Procedure: ESOPHAGOGASTRODUODENOSCOPY (EGD);  Surgeon: Maribell Flores MD;  Location: [facility] ENDO;   Service: Gastroenterology;  Laterality: N/A;  REFENDO placed per Dr Flores. case request entered    ok to hold Plavix 5 days per Dr Saunders-see clinic visit on 10/12/22     instr emailed-GT    HYSTERECTOMY      ROTATOR CUFF REPAIR      TONSILLECTOMY         Social History     Tobacco Use    Smoking status: Never    Smokeless tobacco: Never   Substance Use Topics    Alcohol use: Yes     Comment: Rarely    Drug use: Never       Family History   Adopted: Yes   Problem Relation Age of Onset    Cancer Mother         biological mother with breast cancer    Colon cancer Neg Hx     Esophageal cancer Neg Hx        Physical Exam:   Vitals:    01/27/23 1002   PainSc:   4   PainLoc: Knee       General:   There is no height or weight on file to calculate BMI.  Patient is alert, awake and oriented to time, place and person. Mood and affect are appropriate.  Patient does not appear to be in any distress, denies any constitutional symptoms and appears stated age.   HEENT: Pupils are equal and round, sclera are not injected. External examination of ears and nose reveals no abnormalities. Cranial nerves II-X are grossly intact  Skin: no rashes, abrasions or open wounds on the affected extremity   Resp: No respiratory distress or audible wheezing   CV: 2+  pulses, all extremities warm and well perfused   Left knee(s)  Localizes pain over MJL   no swelling, effusion,  erythema   Active ROM: 0 - 120  No varus/valgus deformity   Tender to palpation over medial joint line   good  quadricep muscle tone and bulk; no atrophy compared to contralateral extremity   normal (0 - 2 mm) Anterior drawer   normal (0 - 2 mm) posterior drawer   ltsi s/s/sp/dp/t   + ehl/fhl/ta/gs  2 + DP      Imaging:  3 views left knee:  mild medial compartmental OA with subchondral sclerosis, joint space narrowing.      I personally reviewed and interpreted the patient's imaging obtained today in clinic     Assessment: 67 y.o. female with Left knee osteoarthritis     We  discussed the findings on xray and clinical exam as well as the natural history of arthritis.     Plan:   - Tylenol OTC, heat   - Cannot take NSAIDs due to CKD   - Can try PT or injection if no improvement with otc meds   - Return to clinic PRN    All questions were answered in detail. The patient is in full agreement with the treatment plan and will proceed accordingly.      This note was created by combination of typed  and M-Modal dictation. Transcription and phonetic errors may be present.  If there are any questions, please contact me.      Current Outpatient Medications:     ascorbic Acid (VITAMIN C) 500 mg CpSR, Take 500 mg by mouth., Disp: , Rfl:     aspirin 81 MG Chew, Take 81 mg by mouth once daily., Disp: , Rfl:     cholecalciferol, vitamin D3, (VITAMIN D3) 50 mcg (2,000 unit) Tab, Take by mouth once daily., Disp: , Rfl:     citalopram (CELEXA) 20 MG tablet, Take 1 tablet (20 mg total) by mouth once daily., Disp: 30 tablet, Rfl: 3    clopidogrel (PLAVIX) 75 mg tablet, TK 1 T PO QD, Disp: , Rfl: 1    enalapril (VASOTEC) 2.5 MG tablet, Take 1 tablet (2.5 mg total) by mouth once daily., Disp: 30 tablet, Rfl: 3    fenofibrate micronized (LOFIBRA) 134 MG Cap, Take 134 mg by mouth once daily., Disp: , Rfl:     FEROSUL 325 mg (65 mg iron) Tab tablet, Take by mouth., Disp: , Rfl:     hydrochlorothiazide (HYDRODIURIL) 12.5 MG Tab, Take 1 tablet (12.5 mg total) by mouth once daily., Disp: 30 tablet, Rfl: 3    isosorbide mononitrate (IMDUR) 30 MG 24 hr tablet, TK 1 T PO  BID, Disp: , Rfl: 1    levocetirizine (XYZAL) 5 MG tablet, Take 1 tablet by mouth every evening., Disp: , Rfl:     linaCLOtide (LINZESS) 145 mcg Cap capsule, Take 1 capsule by mouth every morning., Disp: , Rfl:     lisinopril (PRINIVIL,ZESTRIL) 5 MG tablet, TK 1 T PO QD, Disp: , Rfl: 1    metFORMIN (GLUCOPHAGE) 1000 MG tablet, Take 1,000 mg by mouth 2 (two) times daily with meals., Disp: , Rfl:     metformin (GLUCOPHAGE) 500 MG tablet,  Take 1 tablet (500 mg total) by mouth 2 (two) times daily with meals., Disp: 60 tablet, Rfl: 3    metoprolol succinate (TOPROL-XL) 25 MG 24 hr tablet, Take 1 tablet (25 mg total) by mouth once daily., Disp: 30 tablet, Rfl: 3    pantoprazole (PROTONIX) 40 MG tablet, Take 40 mg by mouth., Disp: , Rfl:     QUEtiapine (SEROQUEL) 100 MG Tab, Take 100 mg by mouth every evening., Disp: , Rfl:     ranitidine (ZANTAC) 150 MG tablet, TK 2 TS PO NIGHTLY, Disp: , Rfl: 2    ranolazine (RANEXA) 500 MG Tb12, TK 1 T PO BID, Disp: , Rfl: 1    rosuvastatin (CRESTOR) 20 MG tablet, TK 1 T PO  QD, Disp: , Rfl: 3    simvastatin (ZOCOR) 80 MG tablet, Take 1 tablet (80 mg total) by mouth every evening., Disp: 30 tablet, Rfl: 3    valsartan (DIOVAN) 80 MG tablet, Take 1 tablet by mouth 2 (two) times daily., Disp: , Rfl:     biotin 10,000 mcg Cap, Take by mouth., Disp: , Rfl:     bromfenac (PROLENSA) 0.07 % Drop, INSTILL 1 DROP INTO SURGICAL EYE AT BEDTIME. START 2 DAYS BEFORE SURGERY, Disp: , Rfl:     doxycycline (VIBRAMYCIN) 100 MG Cap, TK 1 C PO BID FOR 7 DAYS, Disp: , Rfl: 0    fenofibrate micronized (LOFIBRA) 134 MG Cap, Take 1 capsule by mouth every morning., Disp: , Rfl:     hydrocodone-acetaminophen 7.5-500 mg (LORTAB) 7.5-500 mg per tablet, Take by mouth. 1 - 2 Tablet Oral Every 6 hours, Disp: , Rfl:     icosapent ethyL (VASCEPA) 0.5 gram Cap, Take by mouth., Disp: , Rfl:     icosapent ethyL (VASCEPA) 1 gram Cap, Take 1 capsule by mouth 4 (four) times daily., Disp: , Rfl:     levocetirizine (XYZAL) 5 MG tablet, SMARTSI Tablet(s) By Mouth Every Evening, Disp: , Rfl:     loteprednol etabonate (LOTEMAX SM) 0.38 % DrpG, once daily., Disp: , Rfl:     nitroGLYCERIN (NITROSTAT) 0.4 MG SL tablet, Place 1 tablet (0.4 mg total) under the tongue every 5 (five) minutes as needed for Chest pain., Disp: 25 tablet, Rfl: 6    ofloxacin (OCUFLOX) 0.3 % ophthalmic solution, Place 1 drop into both eyes 4 (four) times daily., Disp: , Rfl:      omeprazole (PRILOSEC) 40 MG capsule, Take 1 capsule (40 mg total) by mouth once daily., Disp: 30 capsule, Rfl: 11    PROLENSA 0.07 % Drop, 1 drop every evening., Disp: , Rfl:     zolpidem (AMBIEN) 10 mg Tab, Take 10 mg by mouth., Disp: , Rfl:     Current Facility-Administered Medications:     acetaminophen tablet 650 mg, 650 mg, Oral, Once PRN, Messi Bella MD    albuterol inhaler 2 puff, 2 puff, Inhalation, Q20 Min PRN, Messi Bella MD    diphenhydrAMINE injection 25 mg, 25 mg, Intravenous, Once PRN, Messi Bella MD    EPINEPHrine (EPIPEN) 0.3 mg/0.3 mL pen injection 0.3 mg, 0.3 mg, Intramuscular, PRN, Messi Bella MD    methylPREDNISolone sodium succinate injection 40 mg, 40 mg, Intravenous, Once PRN, Messi Bella MD    ondansetron disintegrating tablet 4 mg, 4 mg, Oral, Once PRN, Messi Bella MD    sodium chloride 0.9% 500 mL flush bag, , Intravenous, PRN, Messi Bella MD    sodium chloride 0.9% flush 10 mL, 10 mL, Intravenous, PRN, Messi Bella MD

## 2023-07-03 ENCOUNTER — OFFICE VISIT (OUTPATIENT)
Dept: ORTHOPEDICS | Facility: CLINIC | Age: 68
End: 2023-07-03
Payer: MEDICARE

## 2023-07-03 DIAGNOSIS — M17.12 PRIMARY OSTEOARTHRITIS OF LEFT KNEE: Primary | ICD-10-CM

## 2023-07-03 PROCEDURE — 99213 PR OFFICE/OUTPT VISIT, EST, LEVL III, 20-29 MIN: ICD-10-PCS | Mod: 25,S$GLB,, | Performed by: ORTHOPAEDIC SURGERY

## 2023-07-03 PROCEDURE — 1101F PR PT FALLS ASSESS DOC 0-1 FALLS W/OUT INJ PAST YR: ICD-10-PCS | Mod: CPTII,S$GLB,, | Performed by: ORTHOPAEDIC SURGERY

## 2023-07-03 PROCEDURE — 99999 PR PBB SHADOW E&M-EST. PATIENT-LVL III: CPT | Mod: PBBFAC,,, | Performed by: ORTHOPAEDIC SURGERY

## 2023-07-03 PROCEDURE — 1159F MED LIST DOCD IN RCRD: CPT | Mod: CPTII,S$GLB,, | Performed by: ORTHOPAEDIC SURGERY

## 2023-07-03 PROCEDURE — 1160F RVW MEDS BY RX/DR IN RCRD: CPT | Mod: CPTII,S$GLB,, | Performed by: ORTHOPAEDIC SURGERY

## 2023-07-03 PROCEDURE — 99999 PR PBB SHADOW E&M-EST. PATIENT-LVL III: ICD-10-PCS | Mod: PBBFAC,,, | Performed by: ORTHOPAEDIC SURGERY

## 2023-07-03 PROCEDURE — 1101F PT FALLS ASSESS-DOCD LE1/YR: CPT | Mod: CPTII,S$GLB,, | Performed by: ORTHOPAEDIC SURGERY

## 2023-07-03 PROCEDURE — 1125F AMNT PAIN NOTED PAIN PRSNT: CPT | Mod: CPTII,S$GLB,, | Performed by: ORTHOPAEDIC SURGERY

## 2023-07-03 PROCEDURE — 3288F FALL RISK ASSESSMENT DOCD: CPT | Mod: CPTII,S$GLB,, | Performed by: ORTHOPAEDIC SURGERY

## 2023-07-03 PROCEDURE — 1159F PR MEDICATION LIST DOCUMENTED IN MEDICAL RECORD: ICD-10-PCS | Mod: CPTII,S$GLB,, | Performed by: ORTHOPAEDIC SURGERY

## 2023-07-03 PROCEDURE — 3288F PR FALLS RISK ASSESSMENT DOCUMENTED: ICD-10-PCS | Mod: CPTII,S$GLB,, | Performed by: ORTHOPAEDIC SURGERY

## 2023-07-03 PROCEDURE — 4010F ACE/ARB THERAPY RXD/TAKEN: CPT | Mod: CPTII,S$GLB,, | Performed by: ORTHOPAEDIC SURGERY

## 2023-07-03 PROCEDURE — 1125F PR PAIN SEVERITY QUANTIFIED, PAIN PRESENT: ICD-10-PCS | Mod: CPTII,S$GLB,, | Performed by: ORTHOPAEDIC SURGERY

## 2023-07-03 PROCEDURE — 4010F PR ACE/ARB THEARPY RXD/TAKEN: ICD-10-PCS | Mod: CPTII,S$GLB,, | Performed by: ORTHOPAEDIC SURGERY

## 2023-07-03 PROCEDURE — 99213 OFFICE O/P EST LOW 20 MIN: CPT | Mod: 25,S$GLB,, | Performed by: ORTHOPAEDIC SURGERY

## 2023-07-03 PROCEDURE — 1160F PR REVIEW ALL MEDS BY PRESCRIBER/CLIN PHARMACIST DOCUMENTED: ICD-10-PCS | Mod: CPTII,S$GLB,, | Performed by: ORTHOPAEDIC SURGERY

## 2023-07-03 NOTE — PROGRESS NOTES
Chief Complaint   Patient presents with    Left Knee - Pain        HPI (1/27/2023): Kirti Caraballo is a 68 y.o. female who presents today complaining of left knee pain  Duration of symptoms:  about 2 months  Progressively worsening   Trauma or new activity: no trauma, was recently in [place] for a month with family, walked up and down stairs a lot more than unusal  Pain is constant  Aggravating factors: weight bearing activity, pain when she gets up and first straightens out her leg   Relieving factors: rest   Radicular symptoms: no numbness, paresthesias   Associated symptoms: no  swelling.    Prior treatment:  None   Pain does interfere with activities of daily living .    7/3/23  Here for follow up of left knee pain   Hurts a lot at night and when straightening the knee after having it flexed for a while       This is the extent of the patient's complaints at this time.        Review of patient's allergies indicates:   Allergen Reactions    Morpholine analogues Itching    Penicillins Hives    Sulfa (sulfonamide antibiotics) Rash         Current Outpatient Medications:     ascorbic Acid (VITAMIN C) 500 mg CpSR, Take 500 mg by mouth., Disp: , Rfl:     aspirin 81 MG Chew, Take 81 mg by mouth once daily., Disp: , Rfl:     cholecalciferol, vitamin D3, (VITAMIN D3) 50 mcg (2,000 unit) Tab, Take by mouth once daily., Disp: , Rfl:     citalopram (CELEXA) 20 MG tablet, Take 1 tablet (20 mg total) by mouth once daily., Disp: 30 tablet, Rfl: 3    clopidogrel (PLAVIX) 75 mg tablet, TK 1 T PO QD, Disp: , Rfl: 1    enalapril (VASOTEC) 2.5 MG tablet, Take 1 tablet (2.5 mg total) by mouth once daily., Disp: 30 tablet, Rfl: 3    fenofibrate micronized (LOFIBRA) 134 MG Cap, Take 134 mg by mouth once daily., Disp: , Rfl:     hydrochlorothiazide (HYDRODIURIL) 12.5 MG Tab, Take 1 tablet (12.5 mg total) by mouth once daily., Disp: 30 tablet, Rfl: 3    isosorbide mononitrate (IMDUR) 30 MG 24 hr tablet, TK 1 T PO  BID, Disp: , Rfl: 1     levocetirizine (XYZAL) 5 MG tablet, Take 1 tablet by mouth every evening., Disp: , Rfl:     linaCLOtide (LINZESS) 145 mcg Cap capsule, Take 1 capsule by mouth every morning., Disp: , Rfl:     lisinopril (PRINIVIL,ZESTRIL) 5 MG tablet, TK 1 T PO QD, Disp: , Rfl: 1    metFORMIN (GLUCOPHAGE) 1000 MG tablet, Take 1,000 mg by mouth 2 (two) times daily with meals., Disp: , Rfl:     metformin (GLUCOPHAGE) 500 MG tablet, Take 1 tablet (500 mg total) by mouth 2 (two) times daily with meals., Disp: 60 tablet, Rfl: 3    metoprolol succinate (TOPROL-XL) 25 MG 24 hr tablet, Take 1 tablet (25 mg total) by mouth once daily., Disp: 30 tablet, Rfl: 3    pantoprazole (PROTONIX) 40 MG tablet, Take 40 mg by mouth., Disp: , Rfl:     QUEtiapine (SEROQUEL) 100 MG Tab, Take 100 mg by mouth every evening., Disp: , Rfl:     ranitidine (ZANTAC) 150 MG tablet, TK 2 TS PO NIGHTLY, Disp: , Rfl: 2    ranolazine (RANEXA) 500 MG Tb12, TK 1 T PO BID, Disp: , Rfl: 1    rosuvastatin (CRESTOR) 20 MG tablet, TK 1 T PO  QD, Disp: , Rfl: 3    simvastatin (ZOCOR) 80 MG tablet, Take 1 tablet (80 mg total) by mouth every evening., Disp: 30 tablet, Rfl: 3    valsartan (DIOVAN) 80 MG tablet, Take 1 tablet by mouth 2 (two) times daily., Disp: , Rfl:     zolpidem (AMBIEN) 10 mg Tab, Take 10 mg by mouth., Disp: , Rfl:     biotin 10,000 mcg Cap, Take by mouth., Disp: , Rfl:     bromfenac (PROLENSA) 0.07 % Drop, INSTILL 1 DROP INTO SURGICAL EYE AT BEDTIME. START 2 DAYS BEFORE SURGERY, Disp: , Rfl:     doxycycline (VIBRAMYCIN) 100 MG Cap, TK 1 C PO BID FOR 7 DAYS, Disp: , Rfl: 0    fenofibrate micronized (LOFIBRA) 134 MG Cap, Take 1 capsule by mouth every morning., Disp: , Rfl:     hydrocodone-acetaminophen 7.5-500 mg (LORTAB) 7.5-500 mg per tablet, Take by mouth. 1 - 2 Tablet Oral Every 6 hours, Disp: , Rfl:     icosapent ethyL (VASCEPA) 0.5 gram Cap, Take by mouth., Disp: , Rfl:     icosapent ethyL (VASCEPA) 1 gram Cap, Take 1 capsule by mouth 4 (four) times  daily., Disp: , Rfl:     levocetirizine (XYZAL) 5 MG tablet, SMARTSI Tablet(s) By Mouth Every Evening, Disp: , Rfl:     loteprednol etabonate (LOTEMAX SM) 0.38 % DrpG, once daily., Disp: , Rfl:     nitroGLYCERIN (NITROSTAT) 0.4 MG SL tablet, Place 1 tablet (0.4 mg total) under the tongue every 5 (five) minutes as needed for Chest pain., Disp: 25 tablet, Rfl: 6    ofloxacin (OCUFLOX) 0.3 % ophthalmic solution, Place 1 drop into both eyes 4 (four) times daily., Disp: , Rfl:     omeprazole (PRILOSEC) 40 MG capsule, Take 1 capsule (40 mg total) by mouth once daily., Disp: 30 capsule, Rfl: 11    PROLENSA 0.07 % Drop, 1 drop every evening., Disp: , Rfl:     Current Facility-Administered Medications:     acetaminophen tablet 650 mg, 650 mg, Oral, Once PRN, Messi Bella MD    albuterol inhaler 2 puff, 2 puff, Inhalation, Q20 Min PRN, Messi Bella MD    diphenhydrAMINE injection 25 mg, 25 mg, Intravenous, Once PRN, Messi Bella MD    EPINEPHrine (EPIPEN) 0.3 mg/0.3 mL pen injection 0.3 mg, 0.3 mg, Intramuscular, PRN, Messi Bella MD    methylPREDNISolone sodium succinate injection 40 mg, 40 mg, Intravenous, Once PRN, Messi Bella MD    ondansetron disintegrating tablet 4 mg, 4 mg, Oral, Once PRN, Messi Bella MD    sodium chloride 0.9% 500 mL flush bag, , Intravenous, PRN, Messi Bella MD    sodium chloride 0.9% flush 10 mL, 10 mL, Intravenous, PRN, Messi Bella MD    Past Medical History:   Diagnosis Date    Coronary artery disease     Hyperlipidemia     Hypertension        Patient Active Problem List   Diagnosis    HTN (hypertension)    Hyperlipidemia    Depression    Coronary artery disease    DM (diabetes mellitus)    Contusion of left knee and lower leg       Past Surgical History:   Procedure Laterality Date    BREAST SURGERY      benign cyst    CARPAL TUNNEL RELEASE       SECTION      ESOPHAGOGASTRODUODENOSCOPY N/A 2022    Procedure:  ESOPHAGOGASTRODUODENOSCOPY (EGD);  Surgeon: Maribell Flores MD;  Location: [facility];  Service: Gastroenterology;  Laterality: N/A;  REFENDO placed per Dr Flores. case request entered    ok to hold Plavix 5 days per Dr Saunders-see clinic visit on 10/12/22     instr emailed-GT    HYSTERECTOMY      ROTATOR CUFF REPAIR      TONSILLECTOMY         Social History     Tobacco Use    Smoking status: Never    Smokeless tobacco: Never   Substance Use Topics    Alcohol use: Yes     Comment: Rarely    Drug use: Never       Family History   Adopted: Yes   Problem Relation Age of Onset    Cancer Mother         biological mother with breast cancer    Colon cancer Neg Hx     Esophageal cancer Neg Hx        Physical Exam:   Vitals:    07/03/23 0949   PainSc:   4   PainLoc: Knee       General: Patient is alert, awake and oriented to time, place and person. Mood and affect are appropriate.  Patient does not appear to be in any distress, denies any constitutional symptoms and appears stated age.   HEENT: Pupils are equal and round, sclera are not injected. External examination of ears and nose reveals no abnormalities. Cranial nerves II-X are grossly intact  Skin: no rashes, abrasions or open wounds on the affected extremity   Resp: No respiratory distress or audible wheezing   CV: 2+  pulses, all extremities warm and well perfused   Left knee(s)  Localizes pain over MJL   no swelling, effusion,  erythema   Active ROM: 0 - 120  No varus/valgus deformity   Tender to palpation over medial joint line   good  quadricep muscle tone and bulk; no atrophy compared to contralateral extremity   normal (0 - 2 mm) Anterior drawer   normal (0 - 2 mm) posterior drawer   ltsi s/s/sp/dp/t   + ehl/fhl/ta/gs  2 + DP      Imaging:  No new     Assessment: 68 y.o. female with Left knee osteoarthritis     We discussed the findings on xray and clinical exam as well as the natural history of arthritis.     Plan:   - Injection of the left knee  performed,  please see procedure note for more details.  Prior to the injection risks and benefits of corticosteroid injection were discussed with the patient including pain, infection, bleeding, skin color changes, swelling, steroid flare. We discussed that over time injections can result in chondral damage, acceleration of arthritis formation, damage to tendons and damage to joints.  The patient consented for the procedure.  Post-injection instructions were given to the patient in writing.  - Return to clinic PRN    All questions were answered in detail. The patient is in full agreement with the treatment plan and will proceed accordingly.      This note was created by combination of typed  and M-Modal dictation. Transcription and phonetic errors may be present.  If there are any questions, please contact me.      Current Outpatient Medications:     ascorbic Acid (VITAMIN C) 500 mg CpSR, Take 500 mg by mouth., Disp: , Rfl:     aspirin 81 MG Chew, Take 81 mg by mouth once daily., Disp: , Rfl:     cholecalciferol, vitamin D3, (VITAMIN D3) 50 mcg (2,000 unit) Tab, Take by mouth once daily., Disp: , Rfl:     citalopram (CELEXA) 20 MG tablet, Take 1 tablet (20 mg total) by mouth once daily., Disp: 30 tablet, Rfl: 3    clopidogrel (PLAVIX) 75 mg tablet, TK 1 T PO QD, Disp: , Rfl: 1    enalapril (VASOTEC) 2.5 MG tablet, Take 1 tablet (2.5 mg total) by mouth once daily., Disp: 30 tablet, Rfl: 3    fenofibrate micronized (LOFIBRA) 134 MG Cap, Take 134 mg by mouth once daily., Disp: , Rfl:     hydrochlorothiazide (HYDRODIURIL) 12.5 MG Tab, Take 1 tablet (12.5 mg total) by mouth once daily., Disp: 30 tablet, Rfl: 3    isosorbide mononitrate (IMDUR) 30 MG 24 hr tablet, TK 1 T PO  BID, Disp: , Rfl: 1    levocetirizine (XYZAL) 5 MG tablet, Take 1 tablet by mouth every evening., Disp: , Rfl:     linaCLOtide (LINZESS) 145 mcg Cap capsule, Take 1 capsule by mouth every morning., Disp: , Rfl:     lisinopril (PRINIVIL,ZESTRIL) 5 MG  tablet, TK 1 T PO QD, Disp: , Rfl: 1    metFORMIN (GLUCOPHAGE) 1000 MG tablet, Take 1,000 mg by mouth 2 (two) times daily with meals., Disp: , Rfl:     metformin (GLUCOPHAGE) 500 MG tablet, Take 1 tablet (500 mg total) by mouth 2 (two) times daily with meals., Disp: 60 tablet, Rfl: 3    metoprolol succinate (TOPROL-XL) 25 MG 24 hr tablet, Take 1 tablet (25 mg total) by mouth once daily., Disp: 30 tablet, Rfl: 3    pantoprazole (PROTONIX) 40 MG tablet, Take 40 mg by mouth., Disp: , Rfl:     QUEtiapine (SEROQUEL) 100 MG Tab, Take 100 mg by mouth every evening., Disp: , Rfl:     ranitidine (ZANTAC) 150 MG tablet, TK 2 TS PO NIGHTLY, Disp: , Rfl: 2    ranolazine (RANEXA) 500 MG Tb12, TK 1 T PO BID, Disp: , Rfl: 1    rosuvastatin (CRESTOR) 20 MG tablet, TK 1 T PO  QD, Disp: , Rfl: 3    simvastatin (ZOCOR) 80 MG tablet, Take 1 tablet (80 mg total) by mouth every evening., Disp: 30 tablet, Rfl: 3    valsartan (DIOVAN) 80 MG tablet, Take 1 tablet by mouth 2 (two) times daily., Disp: , Rfl:     zolpidem (AMBIEN) 10 mg Tab, Take 10 mg by mouth., Disp: , Rfl:     biotin 10,000 mcg Cap, Take by mouth., Disp: , Rfl:     bromfenac (PROLENSA) 0.07 % Drop, INSTILL 1 DROP INTO SURGICAL EYE AT BEDTIME. START 2 DAYS BEFORE SURGERY, Disp: , Rfl:     doxycycline (VIBRAMYCIN) 100 MG Cap, TK 1 C PO BID FOR 7 DAYS, Disp: , Rfl: 0    fenofibrate micronized (LOFIBRA) 134 MG Cap, Take 1 capsule by mouth every morning., Disp: , Rfl:     hydrocodone-acetaminophen 7.5-500 mg (LORTAB) 7.5-500 mg per tablet, Take by mouth. 1 - 2 Tablet Oral Every 6 hours, Disp: , Rfl:     icosapent ethyL (VASCEPA) 0.5 gram Cap, Take by mouth., Disp: , Rfl:     icosapent ethyL (VASCEPA) 1 gram Cap, Take 1 capsule by mouth 4 (four) times daily., Disp: , Rfl:     levocetirizine (XYZAL) 5 MG tablet, SMARTSI Tablet(s) By Mouth Every Evening, Disp: , Rfl:     loteprednol etabonate (LOTEMAX SM) 0.38 % DrpG, once daily., Disp: , Rfl:     nitroGLYCERIN (NITROSTAT) 0.4  MG SL tablet, Place 1 tablet (0.4 mg total) under the tongue every 5 (five) minutes as needed for Chest pain., Disp: 25 tablet, Rfl: 6    ofloxacin (OCUFLOX) 0.3 % ophthalmic solution, Place 1 drop into both eyes 4 (four) times daily., Disp: , Rfl:     omeprazole (PRILOSEC) 40 MG capsule, Take 1 capsule (40 mg total) by mouth once daily., Disp: 30 capsule, Rfl: 11    PROLENSA 0.07 % Drop, 1 drop every evening., Disp: , Rfl:     Current Facility-Administered Medications:     acetaminophen tablet 650 mg, 650 mg, Oral, Once PRN, Messi Bella MD    albuterol inhaler 2 puff, 2 puff, Inhalation, Q20 Min PRN, Messi Bella MD    diphenhydrAMINE injection 25 mg, 25 mg, Intravenous, Once PRN, Messi Bella MD    EPINEPHrine (EPIPEN) 0.3 mg/0.3 mL pen injection 0.3 mg, 0.3 mg, Intramuscular, PRN, Messi Bella MD    methylPREDNISolone sodium succinate injection 40 mg, 40 mg, Intravenous, Once PRN, Messi Bella MD    ondansetron disintegrating tablet 4 mg, 4 mg, Oral, Once PRN, Messi Bella MD    sodium chloride 0.9% 500 mL flush bag, , Intravenous, PRN, Messi Bella MD    sodium chloride 0.9% flush 10 mL, 10 mL, Intravenous, PRN, Messi Bella MD

## 2023-07-28 DIAGNOSIS — Z12.11 COLON CANCER SCREENING: Primary | ICD-10-CM

## 2023-08-30 ENCOUNTER — TELEPHONE (OUTPATIENT)
Dept: ENDOSCOPY | Facility: HOSPITAL | Age: 68
End: 2023-08-30
Payer: MEDICARE

## 2023-08-30 NOTE — TELEPHONE ENCOUNTER
Contacted the patient to schedule an endoscopy procedure(s):  colonoscopy. The patient did not answer the call. Voice mail box was not set up, portal message sent..

## 2023-08-31 ENCOUNTER — TELEPHONE (OUTPATIENT)
Dept: ENDOSCOPY | Facility: HOSPITAL | Age: 68
End: 2023-08-31
Payer: MEDICARE

## 2023-08-31 NOTE — TELEPHONE ENCOUNTER
Contacted the patient to schedule an endoscopy procedure(s):  colonoscopy. The patient stated that she is unable to schedule at this time, and would like to wait until she follows up with her cardiologist in 2 months.

## 2023-12-11 RX ORDER — FERROUS SULFATE TAB 325 MG (65 MG ELEMENTAL FE) 325 (65 FE) MG
TAB ORAL
Qty: 30 TABLET | Refills: 5 | Status: SHIPPED | OUTPATIENT
Start: 2023-12-11

## 2024-07-24 RX ORDER — FERROUS SULFATE TAB 325 MG (65 MG ELEMENTAL FE) 325 (65 FE) MG
TAB ORAL
Qty: 30 TABLET | Refills: 5 | Status: SHIPPED | OUTPATIENT
Start: 2024-07-24

## 2025-01-27 RX ORDER — FERROUS SULFATE 325(65) MG
TABLET ORAL
Qty: 30 TABLET | Refills: 5 | Status: SHIPPED | OUTPATIENT
Start: 2025-01-27

## 2025-02-25 ENCOUNTER — TELEPHONE (OUTPATIENT)
Dept: ORTHOPEDICS | Facility: CLINIC | Age: 70
End: 2025-02-25
Payer: MEDICAID

## 2025-02-25 NOTE — TELEPHONE ENCOUNTER
----- Message from Kinjal sent at 2/25/2025 10:08 AM CST -----  ..Type:  Sooner Appointment RequestPatient is requesting a sooner appointment.  Patient declined first available appointment listed as well as another facility and provider .  Patient will not accept being placed on the waitlist and is requesting a message be sent to doctor.Name of Caller: SelfWhen is the first available appointment? 5/15/25Symptoms: Fell and think she fractured her ribsWould the patient rather a call back or a response via [facility]? Call Back Best Call Back Number: [phone] Information:

## 2025-02-25 NOTE — TELEPHONE ENCOUNTER
Left message for patient to call the clinic if there are any question:  is out of the clinic until 04/28/25 she on maternity leave. The patient need to go to the ED to get treated and also  Dr. Noguera does not treat no fractured ribs.

## 2025-08-26 RX ORDER — FERROUS SULFATE 325(65) MG
TABLET ORAL
Qty: 30 TABLET | Refills: 5 | Status: SHIPPED | OUTPATIENT
Start: 2025-08-26